# Patient Record
Sex: MALE | Race: WHITE | NOT HISPANIC OR LATINO | URBAN - METROPOLITAN AREA
[De-identification: names, ages, dates, MRNs, and addresses within clinical notes are randomized per-mention and may not be internally consistent; named-entity substitution may affect disease eponyms.]

---

## 2018-01-13 NOTE — MISCELLANEOUS
Message   Recorded as Task   Date: 10/21/2016 04:32 PM, Created By: Damion Hancock   Task Name: Med Renewal Request   Assigned To: Nataly Jones   Regarding Patient: Sushil Bean, Status: Active   Comment:    Marilyn Milan - 21 Oct 2016 4:32 PM     TASK CREATED  Patient LM on RX line to request script for Lisinopril be sent for 90 days to [facility]  Any problems please call patient back at [phone]  Thank you  Nataly Jones - 21 Oct 2016 4:49 PM     TASK REPLIED TO: Previously Assigned To Nataly Jones  Can only send 27 to the local pharmacy because he is way past his due date which was December 2015! Marilyn Milan - 21 Oct 2016 4:59 PM     TASK REPLIED TO: Previously Assigned To Marilyn Milan  see other task I sent on accident          Signatures   Electronically signed by : Era Lujan DO; Oct 22 2016  5:14PM EST                       (Author)

## 2018-01-16 NOTE — MISCELLANEOUS
Message   Recorded as Task   Date: 10/21/2016 04:58 PM, Created By: Agnieszka Mohr   Task Name: Med Renewal Request   Assigned To: Nataly Jones   Regarding Patient: Karen Durán, Status: Active   [facility] - 21 Oct 2016 4:58 PM     TASK CREATED  Spoke with patient he is scheduled to see you 11/28/2016  Please adjust his medication so that he does not run out before the visit  Also patient has moved to [place], [place] and would like the script sent to [facility] out there  I added this in his chart please re-scribe  Thank you  Marilyn Milan - 21 Oct 2016 4:58 PM     TASK REASSIGNED: Previously Assigned To Glenna Castellano - 22 Oct 2016 5:17 PM     TASK REPLIED TO: Previously Assigned To Vicki Medina   Please call patient and ask him if it is feasible for him to still come here for his primary care office  Does he simply wanted to find someone in [place]? I did send that 30 days worth of pills, but was thinking of his convenience  Please tell him this  Marilyn Milan - 24 Oct 2016 1:34 PM     TASK REPLIED TO: Previously Assigned To [facility] with patient he states he plans to continue to come to our office  He does not want to switch          Signatures   Electronically signed by : Radha Santoro DO; Oct 24 2016  5:09PM EST                       (Author)

## 2020-11-27 ENCOUNTER — TELEPHONE (OUTPATIENT)
Dept: FAMILY MEDICINE CLINIC | Facility: CLINIC | Age: 60
End: 2020-11-27

## 2021-04-08 DIAGNOSIS — Z23 ENCOUNTER FOR IMMUNIZATION: ICD-10-CM

## 2022-12-21 ENCOUNTER — TELEPHONE (OUTPATIENT)
Dept: FAMILY MEDICINE CLINIC | Facility: CLINIC | Age: 62
End: 2022-12-21

## 2022-12-21 NOTE — TELEPHONE ENCOUNTER
Patient scheduled an appointment to re-establish care here  He would like to have an order for any lab work he should have done prior

## 2022-12-22 DIAGNOSIS — I10 PRIMARY HYPERTENSION: ICD-10-CM

## 2022-12-22 DIAGNOSIS — E78.2 MIXED HYPERLIPIDEMIA: ICD-10-CM

## 2022-12-22 DIAGNOSIS — R73.09 ELEVATED HEMOGLOBIN A1C: Primary | ICD-10-CM

## 2022-12-22 DIAGNOSIS — Z12.5 PROSTATE CANCER SCREENING: ICD-10-CM

## 2022-12-22 DIAGNOSIS — E55.9 VITAMIN D DEFICIENCY: ICD-10-CM

## 2023-01-11 LAB
25(OH)D3 SERPL-MCNC: 34 NG/ML (ref 30–100)
ALBUMIN SERPL-MCNC: 4.2 G/DL (ref 3.6–5.1)
ALBUMIN/GLOB SERPL: 1.8 (CALC) (ref 1–2.5)
ALP SERPL-CCNC: 52 U/L (ref 35–144)
ALT SERPL-CCNC: 26 U/L (ref 9–46)
APPEARANCE UR: CLEAR
AST SERPL-CCNC: 16 U/L (ref 10–35)
BACTERIA UR QL AUTO: ABNORMAL /HPF
BASOPHILS # BLD AUTO: 49 CELLS/UL (ref 0–200)
BASOPHILS NFR BLD AUTO: 0.9 %
BILIRUB SERPL-MCNC: 0.7 MG/DL (ref 0.2–1.2)
BILIRUB UR QL STRIP: NEGATIVE
BUN SERPL-MCNC: 15 MG/DL (ref 7–25)
BUN/CREAT SERPL: ABNORMAL (CALC) (ref 6–22)
CALCIUM SERPL-MCNC: 9.5 MG/DL (ref 8.6–10.3)
CHLORIDE SERPL-SCNC: 100 MMOL/L (ref 98–110)
CHOLEST SERPL-MCNC: 283 MG/DL
CHOLEST/HDLC SERPL: 7.1 (CALC)
CO2 SERPL-SCNC: 29 MMOL/L (ref 20–32)
COLOR UR: YELLOW
CREAT SERPL-MCNC: 0.92 MG/DL (ref 0.7–1.35)
EOSINOPHIL # BLD AUTO: 103 CELLS/UL (ref 15–500)
EOSINOPHIL NFR BLD AUTO: 1.9 %
ERYTHROCYTE [DISTWIDTH] IN BLOOD BY AUTOMATED COUNT: 11.9 % (ref 11–15)
GFR/BSA.PRED SERPLBLD CYS-BASED-ARV: 94 ML/MIN/1.73M2
GLOBULIN SER CALC-MCNC: 2.4 G/DL (CALC) (ref 1.9–3.7)
GLUCOSE SERPL-MCNC: 281 MG/DL (ref 65–99)
GLUCOSE UR QL STRIP: ABNORMAL
HBA1C MFR BLD: 12.9 % OF TOTAL HGB
HCT VFR BLD AUTO: 50.8 % (ref 38.5–50)
HDLC SERPL-MCNC: 40 MG/DL
HGB BLD-MCNC: 16.6 G/DL (ref 13.2–17.1)
HGB UR QL STRIP: NEGATIVE
HYALINE CASTS #/AREA URNS LPF: ABNORMAL /LPF
KETONES UR QL STRIP: NEGATIVE
LDLC SERPL CALC-MCNC: 199 MG/DL (CALC)
LEUKOCYTE ESTERASE UR QL STRIP: NEGATIVE
LYMPHOCYTES # BLD AUTO: 2160 CELLS/UL (ref 850–3900)
LYMPHOCYTES NFR BLD AUTO: 40 %
MCH RBC QN AUTO: 29 PG (ref 27–33)
MCHC RBC AUTO-ENTMCNC: 32.7 G/DL (ref 32–36)
MCV RBC AUTO: 88.7 FL (ref 80–100)
MONOCYTES # BLD AUTO: 432 CELLS/UL (ref 200–950)
MONOCYTES NFR BLD AUTO: 8 %
NEUTROPHILS # BLD AUTO: 2657 CELLS/UL (ref 1500–7800)
NEUTROPHILS NFR BLD AUTO: 49.2 %
NITRITE UR QL STRIP: NEGATIVE
NONHDLC SERPL-MCNC: 243 MG/DL (CALC)
PH UR STRIP: ABNORMAL [PH] (ref 5–8)
PLATELET # BLD AUTO: 209 THOUSAND/UL (ref 140–400)
PMV BLD REES-ECKER: 11.1 FL (ref 7.5–12.5)
POTASSIUM SERPL-SCNC: 4.1 MMOL/L (ref 3.5–5.3)
PROT SERPL-MCNC: 6.6 G/DL (ref 6.1–8.1)
PROT UR QL STRIP: ABNORMAL
PSA SERPL-MCNC: 0.79 NG/ML
RBC # BLD AUTO: 5.73 MILLION/UL (ref 4.2–5.8)
RBC #/AREA URNS HPF: ABNORMAL /HPF
SODIUM SERPL-SCNC: 137 MMOL/L (ref 135–146)
SP GR UR STRIP: 1.02 (ref 1–1.03)
SQUAMOUS #/AREA URNS HPF: ABNORMAL /HPF
TRIGL SERPL-MCNC: 249 MG/DL
TSH SERPL-ACNC: 1.98 MIU/L (ref 0.4–4.5)
WBC # BLD AUTO: 5.4 THOUSAND/UL (ref 3.8–10.8)
WBC #/AREA URNS HPF: ABNORMAL /HPF

## 2023-01-19 ENCOUNTER — OFFICE VISIT (OUTPATIENT)
Dept: FAMILY MEDICINE CLINIC | Facility: CLINIC | Age: 63
End: 2023-01-19

## 2023-01-19 ENCOUNTER — PREP FOR PROCEDURE (OUTPATIENT)
Dept: GASTROENTEROLOGY | Facility: CLINIC | Age: 63
End: 2023-01-19

## 2023-01-19 ENCOUNTER — TELEPHONE (OUTPATIENT)
Dept: GASTROENTEROLOGY | Facility: CLINIC | Age: 63
End: 2023-01-19

## 2023-01-19 VITALS
HEART RATE: 97 BPM | RESPIRATION RATE: 16 BRPM | OXYGEN SATURATION: 98 % | SYSTOLIC BLOOD PRESSURE: 164 MMHG | BODY MASS INDEX: 27.11 KG/M2 | DIASTOLIC BLOOD PRESSURE: 102 MMHG | HEIGHT: 67 IN

## 2023-01-19 DIAGNOSIS — Z23 NEED FOR VACCINATION: ICD-10-CM

## 2023-01-19 DIAGNOSIS — E11.65 TYPE 2 DIABETES MELLITUS WITH HYPERGLYCEMIA, WITHOUT LONG-TERM CURRENT USE OF INSULIN (HCC): ICD-10-CM

## 2023-01-19 DIAGNOSIS — I10 PRIMARY HYPERTENSION: ICD-10-CM

## 2023-01-19 DIAGNOSIS — Z00.00 ROUTINE GENERAL MEDICAL EXAMINATION AT A HEALTH CARE FACILITY: Primary | ICD-10-CM

## 2023-01-19 DIAGNOSIS — Z12.11 SCREENING FOR COLON CANCER: Primary | ICD-10-CM

## 2023-01-19 DIAGNOSIS — E78.2 MIXED HYPERLIPIDEMIA: ICD-10-CM

## 2023-01-19 DIAGNOSIS — Z83.2 FAMILY HISTORY OF FACTOR V DEFICIENCY: ICD-10-CM

## 2023-01-19 DIAGNOSIS — E55.9 VITAMIN D DEFICIENCY: ICD-10-CM

## 2023-01-19 DIAGNOSIS — Z12.11 SCREENING FOR MALIGNANT NEOPLASM OF COLON: ICD-10-CM

## 2023-01-19 RX ORDER — ASPIRIN 81 MG/1
81 TABLET, CHEWABLE ORAL DAILY
Start: 2023-01-19

## 2023-01-19 RX ORDER — MULTIVITAMIN
1 TABLET ORAL DAILY
COMMUNITY

## 2023-01-19 RX ORDER — MELATONIN
2 DAILY
COMMUNITY

## 2023-01-19 RX ORDER — LISINOPRIL 10 MG/1
10 TABLET ORAL
Qty: 90 TABLET | Refills: 1 | Status: SHIPPED | OUTPATIENT
Start: 2023-01-19

## 2023-01-19 RX ORDER — METFORMIN HYDROCHLORIDE 500 MG/1
500 TABLET, EXTENDED RELEASE ORAL 2 TIMES DAILY WITH MEALS
Qty: 180 TABLET | Refills: 1 | Status: SHIPPED | OUTPATIENT
Start: 2023-01-19

## 2023-01-19 NOTE — PATIENT INSTRUCTIONS
Begin lisinopril 10 mg in the evening    Begin metformin one with breakfast one with evening meal    Call to schedule time for colonoscopy    Call to make an eye exam appointment    Call to make a dental appointment    You received a Prevnar pneumonia shot today as well as the shingles vaccine    We will give you 2nd shingles vaccine and your tetanus shot    Recheck here in 3 months or sooner if needed

## 2023-01-19 NOTE — PROGRESS NOTES
SUBJECTIVE:--------------------------------------------------------------------------------------------------    Quin Echeverria is a 58 y o   male and is here for routine health maintenance  Health Maintenance   Topic Date Due   • BMI: Adult  Never done   • Annual Physical  Never done   • Colorectal Cancer Screening  Never done   • DTaP,Tdap,and Td Vaccines (2 - Td or Tdap) 12/31/2022   • HIV Screening  01/19/2025 (Originally 2/22/1975)   • Hepatitis C Screening  02/19/2025 (Originally 1960)   • Depression Screening  01/19/2024   • Influenza Vaccine  Completed   • COVID-19 Vaccine  Completed   • Pneumococcal Vaccine: Pediatrics (0 to 5 Years) and At-Risk Patients (6 to 59 Years)  Aged Out   • HIB Vaccine  Aged Out   • IPV Vaccine  Aged Out   • Hepatitis A Vaccine  Aged Out   • Meningococcal ACWY Vaccine  Aged Out   • HPV Vaccine  Aged [employer] History   Administered Date(s) Administered   • COVID-19 MODERNA VACC 0 5 ML IM 11/03/2021, 04/11/2022, 09/26/2022   • COVID-19 Moderna Vac BIVALENT 6 Yr+ IM (BOOSTER ONLY) 09/26/2022   • COVID-19 PFIZER VACCINE 0 3 ML IM 04/01/2021, 04/22/2021   • INFLUENZA 09/29/2015, 11/28/2016, 10/03/2020, 09/26/2022, 09/26/2022   • Influenza Quadrivalent, 6-35 Months IM 09/29/2015, 11/28/2016   • Pneumococcal Polysaccharide PPV23 03/30/2015   • Tdap 12/31/2012       Diet and Physical Activity  Diet: poor diet  Weight concerns: Patient has class 2 obesity (BMI 35 0-39  9)  Exercise: occasionally       General Health  Hearing:  Normal reported by patient  Vision:  due  Dental:  due    Smoker no       ASSESSMENT/PLAN:---------------------------------------------------------------------------------------    Patient's physical is up-to-date  Immunizations are up-to-date  Cancer screenings are up-to-date      Patient instructed on exercise  Patient instructed on healthy choices for diet      NEXT PHYSICAL 1 YEAR        The following portions of the patient's history were reviewed and updated as appropriate: allergies, current medications, past family history, past medical history, past social history, past surgical history and problem list       OBJECTIVE:--------------------------------------------------------------------------------------------------  BP (!) 164/102 (BP Location: Left arm, Patient Position: Sitting, Cuff Size: Standard)   Pulse 97   Resp 16   Ht 5' 6 5" (1 689 m)   SpO2 98%   BMI 27 11 kg/m²   Wt Readings from Last 3 Encounters:   11/28/16 77 3 kg (170 lb 8 oz)   09/29/15 72 8 kg (160 lb 8 oz)   03/30/15 85 9 kg (189 lb 6 4 oz)     BP Readings from Last 3 Encounters:   01/19/23 (!) 164/102   11/28/16 148/60   09/29/15 110/70     Pulse Readings from Last 3 Encounters:   01/19/23 97   11/28/16 60   09/29/15 60     Body mass index is 27 11 kg/m²  Health Maintenance   Topic Date Due   • BMI: Adult  Never done   • Annual Physical  Never done   • Colorectal Cancer Screening  Never done   • DTaP,Tdap,and Td Vaccines (2 - Td or Tdap) 12/31/2022   • HIV Screening  01/19/2025 (Originally 2/22/1975)   • Hepatitis C Screening  02/19/2025 (Originally 1960)   • Depression Screening  01/19/2024   • Influenza Vaccine  Completed   • COVID-19 Vaccine  Completed   • Pneumococcal Vaccine: Pediatrics (0 to 5 Years) and At-Risk Patients (6 to 59 Years)  Aged Out   • HIB Vaccine  Aged Out   • IPV Vaccine  Aged Out   • Hepatitis A Vaccine  Aged Out   • Meningococcal ACWY Vaccine  Aged Out   • HPV Vaccine  Aged Out         ROS:   12 point review of systems negative            PHYSICAL EXAM:    Gen    No acute distress well-appearing well-nourished appears stated age    Mental status  Good judgment and insight oriented to time person and place, recent and remote memory intact mood and affect normal cooperative and patient is reasonable    HEENT  PERRLA 3 mm, EOMI without nystagmus, TMs clear, turbinates open pink no exudate, pharynx benign, tongue midline    Neck   supple no masses trachea midline positive click normal carotid upstrokes with no bruits    Cor  Regular rhythm without ectopy or murmur, no S3-S4, normal palpation that is no heave lift or thrill    Vascular  No edema, good pedal pulses    Lungs  CTA bilaterally in no respiratory distress no wheezes rhonchi or rales, normal to palpation no tactile fremitus    Abdomen  Soft, no palpable masses, no hepatosplenomegaly, normal bowel sounds, nontender    Lymphatics  No palpable nodes in the neck, supraclavicular area, axilla, or groin     Musculoskeletal  No clubbing cyanosis or edema muscle tone normal    Skin  no rashes or abnormal appearing lesions    Neuro  Normal ambulation, cranial nerves 2-12 grossly intact, higher functioning with reasoning intact

## 2023-01-19 NOTE — PROGRESS NOTES
ASSESSMENT/PLAN:    Hypertension  Very elevated at 164/102  We will start slowly at 1st with his blood pressure and start with lisinopril 10 mg in the evening  Recheck patient in 3 months  Wait until its better control before doing his EKG    Type 2 diabetes  A1c is 12 9 from 7 2  Begin metformin 500 mg twice daily once with breakfast once with evening meal and recheck A1c in 3 months    Hyperlipidemia   patient will try to cut out his sweets and his candy and his licorice  Next visit we will add Lipitor as long as everything else is going in order  His cholesterol is now 283 from 236, triglycerides 249 from 221, and  from 150    Patient will get his 1st Shingrix today and a 2nd one when he returns in 3 months  Patient will get Prevnar 20 today  Patient will get his Adacel when he comes back in 3 months    Information for colonoscopy given  Information for eye exam and dentist    We will check him for factor V deficiency with his next blood work which will include A1c and factor V        Depression Screening and Follow-up Plan: Patient was screened for depression during today's encounter  They screened negative with a PHQ-2 score of 0             Health Maintenance   Topic Date Due   • BMI: Adult  Never done   • Annual Physical  Never done   • Colorectal Cancer Screening  Never done   • DTaP,Tdap,and Td Vaccines (2 - Td or Tdap) 12/31/2022   • HIV Screening  01/19/2025 (Originally 2/22/1975)   • Hepatitis C Screening  02/19/2025 (Originally 1960)   • Depression Screening  01/19/2024   • Influenza Vaccine  Completed   • COVID-19 Vaccine  Completed   • Pneumococcal Vaccine: Pediatrics (0 to 5 Years) and At-Risk Patients (6 to 59 Years)  Aged Out   • HIB Vaccine  Aged Out   • IPV Vaccine  Aged Out   • Hepatitis A Vaccine  Aged Out   • Meningococcal ACWY Vaccine  Aged Out   • HPV Vaccine  Aged Out         Problem List as of 1/19/2023 Reviewed: 1/19/2023  3:12 PM by Roslynn Homans, DO    Elevated hemoglobin A1c Erectile dysfunction of non-organic origin    Mixed hyperlipidemia    Primary hypertension    Vitamin D deficiency         Subjective:   Chief Complaint   Patient presents with   • Establish Care   • Physical Exam     Re-establish   • Hand Pain     Right index finger     Patient is here as a new patient returning after last being seen here in 2016 with a diagnoses of hypertension diabetes and hypercholesterolemia    He has been out of state working and is now back in state  Unfortunately he never really kept up with taking care of his medical disorders and has not had any medicine since that time  He has blood work done today for us to review    He used to run and really like it but has not done that for a while maybe walks twice weekly    Has 2 brothers diagnosed with factor V so he would like to have that checked    Really has no complaints and feels really good which is the reason he never really followed up with anybody      patient ID: Siobhan Denis is a 58 y o  male  Patient's past medical history, surgical history, family history, social history, and Tobacco history reviewed with patient  MED LIST WAS REVIEWED AND UPDATED    ROS  As per HPI  Rest of 12 point review of systems negative     Objective:      VITALS:  Wt Readings from Last 3 Encounters:   11/28/16 77 3 kg (170 lb 8 oz)   09/29/15 72 8 kg (160 lb 8 oz)   03/30/15 85 9 kg (189 lb 6 4 oz)     BP Readings from Last 3 Encounters:   01/19/23 (!) 164/102   11/28/16 148/60   09/29/15 110/70     Pulse Readings from Last 3 Encounters:   01/19/23 97   11/28/16 60   09/29/15 60     Body mass index is 27 11 kg/m²  Laboratory Results: All pertinent labs and studies were reviewed with patient during this office visit with highlights of the results contained in this note in the ASSESSMENT AND PLAN section       Physical Exam      Gen    No acute distress well-appearing well-nourished appears stated age    Mental status  Good judgment and insight oriented to time person and place, recent and remote memory intact mood and affect normal cooperative and patient is reasonable    HEENT  PERRLA 3 mm, EOMI without nystagmus, normocephalic atraumatic without facial weakness    Neck   supple no masses trachea midline positive click normal carotid upstrokes with no bruits    Cor  Regular rhythm without ectopy or murmur, no S3-S4, normal palpation that is no heave lift or thrill    Vascular  No edema, good pedal pulses    Lungs  CTA bilaterally in no respiratory distress no wheezes rhonchi or rales, normal to palpation no tactile fremitus    Abdomen  Soft, no palpable masses, no hepatosplenomegaly, normal bowel sounds, nontender    Lymphatics  No palpable nodes in the neck, supraclavicular area, axilla, or groin    Musculoskeletal  No clubbing cyanosis or edema muscle tone normal    Skin  no rashes or abnormal appearing lesions    Neuro  Normal ambulation, cranial nerves 2-12 grossly intact, higher functioning with reasoning intact

## 2023-01-19 NOTE — TELEPHONE ENCOUNTER
Scheduled date of colonoscopy (as of today):4/10/23  Physician performing colonoscopy:DR JAIYEOLA  Location of colonoscopy:[facility]  Clearances: NA

## 2023-02-06 ENCOUNTER — TELEPHONE (OUTPATIENT)
Dept: FAMILY MEDICINE CLINIC | Facility: CLINIC | Age: 63
End: 2023-02-06

## 2023-02-06 NOTE — TELEPHONE ENCOUNTER
Due to Agustín Owens getting his shingrix and prevnar at his appointment on 1/19, he was supposed to wait and get his Adacel at his next appointment in 3 months, or April  His daughter is expecting a baby and due this month so he and his wife are leaving to go visit them down [address] [place] on February 25  His daughter's doctor wants him to have it before visiting  Can he get this before February 25?

## 2023-02-10 LAB
LEFT EYE DIABETIC RETINOPATHY: NORMAL
RIGHT EYE DIABETIC RETINOPATHY: NORMAL

## 2023-02-10 PROCEDURE — 2023F DILAT RTA XM W/O RTNOPTHY: CPT | Performed by: FAMILY MEDICINE

## 2023-02-14 ENCOUNTER — CLINICAL SUPPORT (OUTPATIENT)
Dept: FAMILY MEDICINE CLINIC | Facility: CLINIC | Age: 63
End: 2023-02-14

## 2023-02-14 DIAGNOSIS — Z23 NEED FOR VACCINATION: Primary | ICD-10-CM

## 2023-04-07 ENCOUNTER — TELEPHONE (OUTPATIENT)
Dept: GASTROENTEROLOGY | Facility: MEDICAL CENTER | Age: 63
End: 2023-04-07

## 2023-04-07 NOTE — TELEPHONE ENCOUNTER
Spoke to patient and moved per Anesthesiologist review to hospital setting due to his A1C for Monday 04/10/2023  Pt is aware that he will receive a call today with his time, he is aware of [address] [place] [address] location  Pt will have his A1C redrawn today to get better understanding of his levels

## 2023-04-08 LAB
25(OH)D3 SERPL-MCNC: 65 NG/ML (ref 30–100)
ALBUMIN SERPL-MCNC: 4.1 G/DL (ref 3.6–5.1)
ALBUMIN/GLOB SERPL: 1.9 (CALC) (ref 1–2.5)
ALP SERPL-CCNC: 49 U/L (ref 35–144)
ALT SERPL-CCNC: 32 U/L (ref 9–46)
APPEARANCE UR: CLEAR
AST SERPL-CCNC: 28 U/L (ref 10–35)
BASOPHILS # BLD AUTO: 61 CELLS/UL (ref 0–200)
BASOPHILS NFR BLD AUTO: 1.2 %
BILIRUB SERPL-MCNC: 0.6 MG/DL (ref 0.2–1.2)
BILIRUB UR QL STRIP: NEGATIVE
BUN SERPL-MCNC: 17 MG/DL (ref 7–25)
BUN/CREAT SERPL: NORMAL (CALC) (ref 6–22)
CALCIUM SERPL-MCNC: 9.3 MG/DL (ref 8.6–10.3)
CHLORIDE SERPL-SCNC: 103 MMOL/L (ref 98–110)
CHOLEST SERPL-MCNC: 198 MG/DL
CHOLEST/HDLC SERPL: 4.5 (CALC)
CO2 SERPL-SCNC: 23 MMOL/L (ref 20–32)
COLOR UR: ABNORMAL
CREAT SERPL-MCNC: 0.96 MG/DL (ref 0.7–1.35)
EOSINOPHIL # BLD AUTO: 117 CELLS/UL (ref 15–500)
EOSINOPHIL NFR BLD AUTO: 2.3 %
ERYTHROCYTE [DISTWIDTH] IN BLOOD BY AUTOMATED COUNT: 12.7 % (ref 11–15)
GFR/BSA.PRED SERPLBLD CYS-BASED-ARV: 89 ML/MIN/1.73M2
GLOBULIN SER CALC-MCNC: 2.2 G/DL (CALC) (ref 1.9–3.7)
GLUCOSE SERPL-MCNC: 95 MG/DL (ref 65–99)
GLUCOSE UR QL STRIP: NEGATIVE
HBA1C MFR BLD: 6.9 % OF TOTAL HGB
HCT VFR BLD AUTO: 42.6 % (ref 38.5–50)
HDLC SERPL-MCNC: 44 MG/DL
HGB BLD-MCNC: 14 G/DL (ref 13.2–17.1)
HGB UR QL STRIP: NEGATIVE
KETONES UR QL STRIP: ABNORMAL
LDLC SERPL CALC-MCNC: 132 MG/DL (CALC)
LEUKOCYTE ESTERASE UR QL STRIP: NEGATIVE
LYMPHOCYTES # BLD AUTO: 1780 CELLS/UL (ref 850–3900)
LYMPHOCYTES NFR BLD AUTO: 34.9 %
MCH RBC QN AUTO: 29.5 PG (ref 27–33)
MCHC RBC AUTO-ENTMCNC: 32.9 G/DL (ref 32–36)
MCV RBC AUTO: 89.7 FL (ref 80–100)
MONOCYTES # BLD AUTO: 490 CELLS/UL (ref 200–950)
MONOCYTES NFR BLD AUTO: 9.6 %
NEUTROPHILS # BLD AUTO: 2652 CELLS/UL (ref 1500–7800)
NEUTROPHILS NFR BLD AUTO: 52 %
NITRITE UR QL STRIP: NEGATIVE
NONHDLC SERPL-MCNC: 154 MG/DL (CALC)
PH UR STRIP: ABNORMAL [PH] (ref 5–8)
PLATELET # BLD AUTO: 215 THOUSAND/UL (ref 140–400)
PMV BLD REES-ECKER: 11.1 FL (ref 7.5–12.5)
POTASSIUM SERPL-SCNC: 4.4 MMOL/L (ref 3.5–5.3)
PROT SERPL-MCNC: 6.3 G/DL (ref 6.1–8.1)
PROT UR QL STRIP: NEGATIVE
PSA SERPL-MCNC: 1.85 NG/ML
RBC # BLD AUTO: 4.75 MILLION/UL (ref 4.2–5.8)
SODIUM SERPL-SCNC: 139 MMOL/L (ref 135–146)
SP GR UR STRIP: 1.03 (ref 1–1.03)
TRIGL SERPL-MCNC: 114 MG/DL
TSH SERPL-ACNC: 1.23 MIU/L (ref 0.4–4.5)
WBC # BLD AUTO: 5.1 THOUSAND/UL (ref 3.8–10.8)

## 2023-04-10 RX ORDER — LIDOCAINE HYDROCHLORIDE 10 MG/ML
0.5 INJECTION, SOLUTION EPIDURAL; INFILTRATION; INTRACAUDAL; PERINEURAL ONCE AS NEEDED
Status: CANCELLED | OUTPATIENT
Start: 2023-04-10

## 2023-04-10 RX ORDER — SODIUM CHLORIDE, SODIUM LACTATE, POTASSIUM CHLORIDE, CALCIUM CHLORIDE 600; 310; 30; 20 MG/100ML; MG/100ML; MG/100ML; MG/100ML
125 INJECTION, SOLUTION INTRAVENOUS CONTINUOUS
Status: CANCELLED | OUTPATIENT
Start: 2023-04-10

## 2023-08-23 LAB
ALBUMIN SERPL-MCNC: 4.3 G/DL (ref 3.6–5.1)
ALBUMIN SERPL-MCNC: 4.3 G/DL (ref 3.6–5.1)
ALBUMIN/CREAT UR: 6 MCG/MG CREAT
ALBUMIN/GLOB SERPL: 2 (CALC) (ref 1–2.5)
ALP SERPL-CCNC: 47 U/L (ref 35–144)
ALT SERPL-CCNC: 17 U/L (ref 9–46)
AST SERPL-CCNC: 19 U/L (ref 10–35)
BILIRUB DIRECT SERPL-MCNC: 0.1 MG/DL
BILIRUB INDIRECT SERPL-MCNC: 0.5 MG/DL (CALC) (ref 0.2–1.2)
BILIRUB SERPL-MCNC: 0.6 MG/DL (ref 0.2–1.2)
BUN SERPL-MCNC: 19 MG/DL (ref 7–25)
BUN/CREAT SERPL: ABNORMAL (CALC) (ref 6–22)
CALCIUM SERPL-MCNC: 9.4 MG/DL (ref 8.6–10.3)
CHLORIDE SERPL-SCNC: 104 MMOL/L (ref 98–110)
CHOLEST SERPL-MCNC: 173 MG/DL
CHOLEST/HDLC SERPL: 3.8 (CALC)
CO2 SERPL-SCNC: 27 MMOL/L (ref 20–32)
CREAT SERPL-MCNC: 0.94 MG/DL (ref 0.7–1.35)
CREAT UR-MCNC: 77 MG/DL (ref 20–320)
GFR/BSA.PRED SERPLBLD CYS-BASED-ARV: 91 ML/MIN/1.73M2
GLOBULIN SER CALC-MCNC: 2.2 G/DL (CALC) (ref 1.9–3.7)
GLUCOSE SERPL-MCNC: 120 MG/DL (ref 65–99)
HBA1C MFR BLD: 6.1 % OF TOTAL HGB
HDLC SERPL-MCNC: 46 MG/DL
LDLC SERPL CALC-MCNC: 100 MG/DL (CALC)
MICROALBUMIN UR-MCNC: 0.5 MG/DL
NONHDLC SERPL-MCNC: 127 MG/DL (CALC)
PHOSPHATE SERPL-MCNC: 3.9 MG/DL (ref 2.5–4.5)
POTASSIUM SERPL-SCNC: 4.6 MMOL/L (ref 3.5–5.3)
PROT SERPL-MCNC: 6.5 G/DL (ref 6.1–8.1)
SODIUM SERPL-SCNC: 140 MMOL/L (ref 135–146)
TRIGL SERPL-MCNC: 174 MG/DL

## 2023-08-29 ENCOUNTER — OFFICE VISIT (OUTPATIENT)
Dept: FAMILY MEDICINE CLINIC | Facility: CLINIC | Age: 63
End: 2023-08-29
Payer: COMMERCIAL

## 2023-08-29 VITALS
DIASTOLIC BLOOD PRESSURE: 84 MMHG | SYSTOLIC BLOOD PRESSURE: 130 MMHG | HEART RATE: 61 BPM | WEIGHT: 182.2 LBS | TEMPERATURE: 97.7 F | OXYGEN SATURATION: 98 % | RESPIRATION RATE: 15 BRPM | BODY MASS INDEX: 28.6 KG/M2 | HEIGHT: 67 IN

## 2023-08-29 DIAGNOSIS — E55.9 VITAMIN D DEFICIENCY: ICD-10-CM

## 2023-08-29 DIAGNOSIS — I10 PRIMARY HYPERTENSION: ICD-10-CM

## 2023-08-29 DIAGNOSIS — E11.65 TYPE 2 DIABETES MELLITUS WITH HYPERGLYCEMIA, WITHOUT LONG-TERM CURRENT USE OF INSULIN (HCC): Primary | ICD-10-CM

## 2023-08-29 DIAGNOSIS — E78.2 MIXED HYPERLIPIDEMIA: ICD-10-CM

## 2023-08-29 PROCEDURE — 99215 OFFICE O/P EST HI 40 MIN: CPT | Performed by: FAMILY MEDICINE

## 2023-08-29 RX ORDER — LISINOPRIL 10 MG/1
10 TABLET ORAL
Qty: 90 TABLET | Refills: 1 | Status: SHIPPED | OUTPATIENT
Start: 2023-08-29

## 2023-08-29 RX ORDER — ROSUVASTATIN CALCIUM 5 MG/1
10 TABLET, COATED ORAL EVERY EVENING
Qty: 90 TABLET | Refills: 3 | Status: SHIPPED | OUTPATIENT
Start: 2023-08-29

## 2023-08-29 RX ORDER — METFORMIN HYDROCHLORIDE 500 MG/1
500 TABLET, EXTENDED RELEASE ORAL 2 TIMES DAILY WITH MEALS
Qty: 180 TABLET | Refills: 1 | Status: SHIPPED | OUTPATIENT
Start: 2023-08-29

## 2023-08-29 NOTE — PROGRESS NOTES
ASSESSMENT/PLAN:    Hypertension  Blood pressure 130/84  Continue lisinopril and exercise and recheck in 3 months    Type 2 diabetes  Despite medication A1c 6.1 from 6.9  Continue dietary changes and metformin for now  If we get into the fives we will start taking away the metformin    Hyperlipidemia  Triglycerides 174 from 114 but everything else improved  However the LDL is 100 from 132 but we really needed to be 80 for protection for his heart  Since he is already on rosuvastatin we will go from 5-10 recheck his lipids and liver with a A1c in 4 months          Recheck in 4 months with fasting blood work to check lipids and liver because of adding rosuvastatin and A1c  Recheck sooner if needed           Depression Screening and Follow-up Plan: Patient was screened for depression during today's encounter. They screened negative with a PHQ-2 score of 0.            Health Maintenance   Topic Date Due   • COVID-19 Vaccine (6 - Pfizer series) 11/21/2022   • Influenza Vaccine (1) 09/01/2023   • HIV Screening  01/19/2025 (Originally 2/22/1975)   • Hepatitis C Screening  02/19/2025 (Originally 1960)   • Annual Physical  01/19/2024   • HEMOGLOBIN A1C  02/23/2024   • BMI: Followup Plan  04/18/2024   • BMI: Adult  04/18/2024   • Diabetic Foot Exam  04/18/2024   • Kidney Health Evaluation: GFR  08/23/2024   • Kidney Health Evaluation: Albumin/Creatinine Ratio  08/23/2024   • Depression Screening  08/29/2024   • DM Eye Exam  02/10/2025   • Colorectal Cancer Screening  04/09/2026   • DTaP,Tdap,and Td Vaccines (3 - Td or Tdap) 02/14/2033   • Pneumococcal Vaccine: Pediatrics (0 to 5 Years) and At-Risk Patients (6 to 59 Years)  Completed   • HIB Vaccine  Aged Out   • IPV Vaccine  Aged Out   • Hepatitis A Vaccine  Aged Out   • Meningococcal ACWY Vaccine  Aged Out   • HPV Vaccine  Aged Out         Problem List as of 8/29/2023 Reviewed: 4/18/2023  8:05 PM by James Amaro, DO    Erectile dysfunction of non-organic origin    Mixed hyperlipidemia    Primary hypertension    Type 2 diabetes mellitus with hyperglycemia, without long-term current use of insulin (HCC)    Vitamin D deficiency         Subjective:   Chief Complaint   Patient presents with   • Follow-up     Diabetes   • Diabetes     Patient is here for his 4-month diabetes recheck    Since last visit patient has been on it 8-week vacation and Praneeth Cristhian every bit of it  He ate more and drink more than usual and walks less  Nonetheless his A1c has improved and he feels good    He has blood work for us to review      patient ID: Lucas Marks is a 61 y.o. male. Patient's past medical history, surgical history, family history, social history, and Tobacco history reviewed with patient. MED LIST WAS REVIEWED AND UPDATED    ROS  As per HPI  Rest of 12 point review of systems negative     Objective:      VITALS:  Wt Readings from Last 3 Encounters:   08/29/23 82.6 kg (182 lb 3.2 oz)   04/18/23 82.9 kg (182 lb 12.8 oz)   04/10/23 81.6 kg (180 lb)     BP Readings from Last 3 Encounters:   08/29/23 130/84   04/18/23 120/74   04/10/23 116/63     Pulse Readings from Last 3 Encounters:   08/29/23 61   04/18/23 70   04/10/23 61     Body mass index is 28.97 kg/m². Laboratory Results:    All pertinent labs and studies were reviewed with patient during this office visit with highlights of the results contained in this note in the ASSESSMENT AND PLAN section       Physical Exam    Constitutional  Appears healthy, Looks well, Appearance consistent with age    Mental Status  Alert, Oriented, Cooperative, Memory function normal , clean, and reasonable    Neck  No neck mass, No thyromegaly, Good carotid upstrokes bilaterally, trachea midline positive click    Respiratory  Breath sounds normal, No rales, No rhonchi, No wheezing, normal palpation    Cardiac  Regular rhythm without ectopy or murmur no S3-S4, no heave lift or thrill to palpation    Vascular  No leg edema, No pedal edema    Muscular skeletal  No clubbing cyanosis , muscle tone normal    Skin  No appreciable rashes or abnormal appearing lesions

## 2023-08-29 NOTE — PATIENT INSTRUCTIONS
Please increase your rosuvastatin from 5 to 10 mg tablets  Take 2 together in the evening until you run out of the 5  New prescription is tense only take one of those once you get that  Continue the co-Q10  We will recheck the value in 4 months when you come in to have your A1c checked    Continue everything else as it is  Goal is to get A1c into the 5 range then we could start backing off on the metformin    See in 4 months with fasting blood work prior or sooner if needed  Member flu shot and COVID shot the end of September

## 2023-09-18 DIAGNOSIS — E11.65 TYPE 2 DIABETES MELLITUS WITH HYPERGLYCEMIA, WITHOUT LONG-TERM CURRENT USE OF INSULIN (HCC): ICD-10-CM

## 2023-09-18 NOTE — TELEPHONE ENCOUNTER
Pt claims, according to [facility], ins will not cover 2 tablets per day  - can you write the script for 1 tablet @ 10 mg, 1xday instead?

## 2023-09-19 RX ORDER — ROSUVASTATIN CALCIUM 10 MG/1
10 TABLET, COATED ORAL EVERY EVENING
Qty: 90 TABLET | Refills: 3 | Status: SHIPPED | OUTPATIENT
Start: 2023-09-19

## 2024-01-04 ENCOUNTER — OFFICE VISIT (OUTPATIENT)
Dept: FAMILY MEDICINE CLINIC | Facility: CLINIC | Age: 64
End: 2024-01-04
Payer: COMMERCIAL

## 2024-01-04 ENCOUNTER — HOSPITAL ENCOUNTER (OUTPATIENT)
Dept: NON INVASIVE DIAGNOSTICS | Facility: HOSPITAL | Age: 64
Discharge: HOME | End: 2024-01-04
Payer: COMMERCIAL

## 2024-01-04 VITALS
HEIGHT: 66 IN | BODY MASS INDEX: 31.39 KG/M2 | WEIGHT: 195.3 LBS | SYSTOLIC BLOOD PRESSURE: 170 MMHG | HEART RATE: 74 BPM | RESPIRATION RATE: 16 BRPM | DIASTOLIC BLOOD PRESSURE: 98 MMHG

## 2024-01-04 DIAGNOSIS — M79.662 PAIN OF LEFT CALF: Primary | ICD-10-CM

## 2024-01-04 DIAGNOSIS — E11.65 TYPE 2 DIABETES MELLITUS WITH HYPERGLYCEMIA, WITHOUT LONG-TERM CURRENT USE OF INSULIN (HCC): ICD-10-CM

## 2024-01-04 DIAGNOSIS — M79.662 PAIN OF LEFT CALF: ICD-10-CM

## 2024-01-04 PROCEDURE — 99214 OFFICE O/P EST MOD 30 MIN: CPT | Performed by: FAMILY MEDICINE

## 2024-01-04 PROCEDURE — 93971 EXTREMITY STUDY: CPT

## 2024-01-04 PROCEDURE — 93971 EXTREMITY STUDY: CPT | Performed by: SURGERY

## 2024-01-04 NOTE — PROGRESS NOTES
Assessment/Plan:    Calf pain  Stat ultrasound of his lower leg to rule out DVT especially before he drives to [place] starting in 2 days    If this is negative he will go to physical therapy for stretching exercises ultrasound and possibly [facility] [employer].  Will wait to order until we know what ultrasound shows    Diabetes  Patient has been under control with diet  Pain can increase it so we will keep this in mind at his next office visit and recheck his A1c          Depression Screening and Follow-up Plan: Patient was screened for depression during today's encounter. They screened negative with a PHQ-2 score of 0.          Subjective:   Chuck Cobos is a 63 y.o.male  Chief Complaint   Patient presents with    Leg Pain     L calf pain     About a month ago while patient was running he felt a tightening in his left calf so he stopped and rested it and started walking and it seemed to feel better.  Yesterday patient went to the gym and started a light run on a treadmill and after about a mile and a half it started to tighten up again with the same feeling, as though someone has punched his calf  There is no noticeable swelling or change in color of the skin  Has not had anything like this in the past  Leaving for [place] for 2 weeks at the end of the week        Past medical history, social history, and family history reviewed as appropriate for the complaint of this patient.      MEDICATIONS REVIEWED AND UPDATED    10 point review of systems performed, the remainder of the ROS is negative except for what is noted in the history of chief complaint    Objective:    Vitals:    01/04/24 1444   BP: 170/98   Pulse: 74   Resp: 16     Body mass index is 31.76 kg/m².    Physical Exam    General  Patient in no acute distress, well appearing, well nourished and appears stated age    Mental status  Good judgment and insight, oriented to time person and place, recent and remote memory is intact, mood and affect are normal,  cooperative, and patient is reasonable.    Left calf/gastrocnemius  Normal size shape some pain behind the gastrocnemius on the medial side with palpation  Negative Homans

## 2024-01-04 NOTE — PATIENT INSTRUCTIONS
We will get an ultrasound either tonight or tomorrow morning    If it is negative then call us when you get back so we could set up an order for physical therapy for you

## 2024-01-16 LAB — HBA1C MFR BLD HPLC: 7 %

## 2024-01-25 ENCOUNTER — OFFICE VISIT (OUTPATIENT)
Dept: FAMILY MEDICINE CLINIC | Facility: CLINIC | Age: 64
End: 2024-01-25
Payer: COMMERCIAL

## 2024-01-25 VITALS
RESPIRATION RATE: 15 BRPM | BODY MASS INDEX: 31.42 KG/M2 | TEMPERATURE: 97.8 F | HEART RATE: 88 BPM | HEIGHT: 66 IN | DIASTOLIC BLOOD PRESSURE: 80 MMHG | SYSTOLIC BLOOD PRESSURE: 132 MMHG | WEIGHT: 195.5 LBS | OXYGEN SATURATION: 97 %

## 2024-01-25 DIAGNOSIS — M79.662 PAIN OF LEFT CALF: ICD-10-CM

## 2024-01-25 DIAGNOSIS — Z12.5 SCREENING FOR PROSTATE CANCER: ICD-10-CM

## 2024-01-25 DIAGNOSIS — N52.8 OTHER MALE ERECTILE DYSFUNCTION: ICD-10-CM

## 2024-01-25 DIAGNOSIS — I10 PRIMARY HYPERTENSION: ICD-10-CM

## 2024-01-25 DIAGNOSIS — E55.9 VITAMIN D DEFICIENCY: ICD-10-CM

## 2024-01-25 DIAGNOSIS — Z00.00 ROUTINE GENERAL MEDICAL EXAMINATION AT A HEALTH CARE FACILITY: ICD-10-CM

## 2024-01-25 DIAGNOSIS — E78.2 MIXED HYPERLIPIDEMIA: ICD-10-CM

## 2024-01-25 DIAGNOSIS — E11.65 TYPE 2 DIABETES MELLITUS WITH HYPERGLYCEMIA, WITHOUT LONG-TERM CURRENT USE OF INSULIN (HCC): Primary | ICD-10-CM

## 2024-01-25 PROCEDURE — 99396 PREV VISIT EST AGE 40-64: CPT | Performed by: FAMILY MEDICINE

## 2024-01-25 PROCEDURE — 99214 OFFICE O/P EST MOD 30 MIN: CPT | Performed by: FAMILY MEDICINE

## 2024-01-25 RX ORDER — LISINOPRIL 10 MG/1
10 TABLET ORAL
Qty: 90 TABLET | Refills: 1 | Status: SHIPPED | OUTPATIENT
Start: 2024-01-25

## 2024-01-25 RX ORDER — EZETIMIBE 10 MG/1
10 TABLET ORAL DAILY
Qty: 90 TABLET | Refills: 3 | Status: SHIPPED | OUTPATIENT
Start: 2024-01-25 | End: 2025-01-19

## 2024-01-25 RX ORDER — METFORMIN HYDROCHLORIDE 500 MG/1
500 TABLET, EXTENDED RELEASE ORAL 2 TIMES DAILY WITH MEALS
Qty: 180 TABLET | Refills: 1 | Status: SHIPPED | OUTPATIENT
Start: 2024-01-25

## 2024-01-25 RX ORDER — TADALAFIL 5 MG/1
5 TABLET ORAL DAILY PRN
Qty: 90 TABLET | Refills: 3 | Status: SHIPPED | OUTPATIENT
Start: 2024-01-25 | End: 2024-01-29 | Stop reason: SDUPTHER

## 2024-01-25 NOTE — PROGRESS NOTES
SUBJECTIVE:--------------------------------------------------------------------------------------------------  Patient is here for a well exam         Chuck Cobos is a 63 y.o.  male and is here for routine health maintenance.     Health Maintenance   Topic Date Due    COVID-19 Vaccine (8 - 2023-24 season) 12/24/2023    Annual Physical  01/19/2024    HEMOGLOBIN A1C  02/23/2024    HIV Screening  01/19/2025 (Originally 2/22/1975)    Hepatitis C Screening  02/19/2025 (Originally 1960)    Kidney Health Evaluation: GFR  08/23/2024    Kidney Health Evaluation: Albumin/Creatinine Ratio  08/23/2024    Depression Screening  01/04/2025    Diabetic Foot Exam  01/25/2025    DM Eye Exam  02/10/2025    Colorectal Cancer Screening  04/09/2026    DTaP,Tdap,and Td Vaccines (3 - Td or Tdap) 02/14/2033    Zoster Vaccine  Completed    Pneumococcal Vaccine: Pediatrics (0 to 5 Years) and At-Risk Patients (6 to 64 Years)  Completed    Influenza Vaccine  Completed    HIB Vaccine  Aged Out    IPV Vaccine  Aged Out    Hepatitis A Vaccine  Aged Out    Meningococcal ACWY Vaccine  Aged Out    HPV Vaccine  Aged Out     Immunization History   Administered Date(s) Administered    COVID-19 MODERNA VACC 0.5 ML IM 11/03/2021, 04/11/2022, 09/26/2022    COVID-19 Moderna Vac BIVALENT 12 Yr+ IM 0.5 ML 09/26/2022    COVID-19 PFIZER VACCINE 0.3 ML IM 04/01/2021, 04/22/2021    COVID-19 Pfizer Vac BIVALENT Lucia-sucrose 12 Yr+ IM 10/29/2023    COVID-19 Pfizer mRNA vacc PF lucia-sucrose 12 yr and older (Comirnaty) 10/29/2023    INFLUENZA 09/29/2015, 11/28/2016, 10/03/2020, 09/26/2022, 09/26/2022, 09/25/2023    Influenza Quadrivalent, 6-35 Months IM 09/29/2015, 11/28/2016    Pneumococcal Conjugate Vaccine 20-valent (Pcv20), Polysace 01/19/2023    Pneumococcal Polysaccharide PPV23 03/30/2015    Tdap 12/31/2012, 02/14/2023    Zoster Vaccine Recombinant 01/19/2023, 04/18/2023       Diet and Physical Activity  Diet: low fat diet and low carbohydrate  "diet  Weight concerns: Patient has class 1 obesity (BMI 30-34.9)  Exercise: infrequently       General Health  Hearing:  Normal reported by patient  Vision:  Sees Ophthalmology/Optometry yearly  Dental:  Sees his dentist every 6 months    Smoker no       ASSESSMENT/PLAN:---------------------------------------------------------------------------------------    Patient's physical is up-to-date  Immunizations are up-to-date  Cancer screenings are up-to-date      Patient instructed on exercise  Patient instructed on healthy choices for diet      NEXT PHYSICAL 1 YEAR        The following portions of the patient's history were reviewed and updated as appropriate: allergies, current medications, past family history, past medical history, past social history, past surgical history and problem list.      OBJECTIVE:--------------------------------------------------------------------------------------------------  /80   Pulse 88   Temp 97.8 °F (36.6 °C) (Temporal)   Resp 15   Ht 5' 6\" (1.676 m)   Wt 88.7 kg (195 lb 8 oz)   SpO2 97%   BMI 31.55 kg/m²   Wt Readings from Last 3 Encounters:   01/25/24 88.7 kg (195 lb 8 oz)   01/04/24 88.6 kg (195 lb 4.8 oz)   08/29/23 82.6 kg (182 lb 3.2 oz)     BP Readings from Last 3 Encounters:   01/25/24 132/80   01/04/24 170/98   08/29/23 130/84     Pulse Readings from Last 3 Encounters:   01/25/24 88   01/04/24 74   08/29/23 61     Body mass index is 31.55 kg/m².  Health Maintenance   Topic Date Due    COVID-19 Vaccine (8 - 2023-24 season) 12/24/2023    Annual Physical  01/19/2024    HEMOGLOBIN A1C  02/23/2024    HIV Screening  01/19/2025 (Originally 2/22/1975)    Hepatitis C Screening  02/19/2025 (Originally 1960)    Kidney Health Evaluation: GFR  08/23/2024    Kidney Health Evaluation: Albumin/Creatinine Ratio  08/23/2024    Depression Screening  01/04/2025    Diabetic Foot Exam  01/25/2025    DM Eye Exam  02/10/2025    Colorectal Cancer Screening  04/09/2026    " DTaP,Tdap,and Td Vaccines (3 - Td or Tdap) 02/14/2033    Zoster Vaccine  Completed    Pneumococcal Vaccine: Pediatrics (0 to 5 Years) and At-Risk Patients (6 to 64 Years)  Completed    Influenza Vaccine  Completed    HIB Vaccine  Aged Out    IPV Vaccine  Aged Out    Hepatitis A Vaccine  Aged Out    Meningococcal ACWY Vaccine  Aged Out    HPV Vaccine  Aged Out         ROS:   12 point review of systems negative            PHYSICAL EXAM:    Gen.  No acute distress well-appearing well-nourished appears stated age    Mental status  Good judgment and insight oriented to time person and place, recent and remote memory intact mood and affect normal cooperative and patient is reasonable    HEENT  PERRLA 3 mm, EOMI without nystagmus, TMs clear, turbinates open pink no exudate, pharynx benign, tongue midline    Neck   supple no masses trachea midline positive click normal carotid upstrokes with no bruits    Cor  Regular rhythm without ectopy or murmur, no S3-S4, normal palpation that is no heave lift or thrill    Vascular  No edema, good pedal pulses    Lungs  CTA bilaterally in no respiratory distress no wheezes rhonchi or rales, normal to palpation no tactile fremitus    Abdomen  Soft, no palpable masses, no hepatosplenomegaly, normal bowel sounds, nontender    Lymphatics  No palpable nodes in the neck, supraclavicular area, axilla, or groin     Musculoskeletal  No clubbing cyanosis or edema muscle tone normal    Skin  no rashes or abnormal appearing lesions    Neuro  Normal ambulation, cranial nerves 2-12 grossly intact, higher functioning with reasoning intact.

## 2024-01-25 NOTE — PATIENT INSTRUCTIONS
Clean up your diet and when your calf is better start exercising  Continue metformin    Add ezetimibe to your rosuvastatin does not have to be taken at the same time dose daily    Blood work on or after April 8  Will be fasting with urine    We will see you after that or sooner if needed

## 2024-01-25 NOTE — PROGRESS NOTES
ASSESSMENT/PLAN:    Type 2 diabetes  A1c 7 from 6.1   We will keep the metformin the same  He will increase his exercise  He will decrease his processed carbs  We will check it again in 3 months    Hyperlipidemia  Despite increasing rosuvastatin from 5 to 10 mg not much is changed as his cholesterol is 178 from 173, triglycerides 156 from 174 and  from 100  We will keep the rosuvastatin at 10 mg and add ezetimibe and recheck in 3 months    Hypertension  Blood pressure 132/80  Continue lisinopril diet exercise and weight control    Continued pain in the left calf  To physical therapy for [place] and ultrasound    Vitamin D deficiency  Continue vitamin D daily and check before next visit    Erectile dysfunction  Trial Cialis daily      Blood work in 3 months  Office visit 4 months  Recheck sooner if needed              Health Maintenance   Topic Date Due    COVID-19 Vaccine (8 - 2023-24 season) 12/24/2023    Annual Physical  01/19/2024    HEMOGLOBIN A1C  02/23/2024    HIV Screening  01/19/2025 (Originally 2/22/1975)    Hepatitis C Screening  02/19/2025 (Originally 1960)    Kidney Health Evaluation: GFR  08/23/2024    Kidney Health Evaluation: Albumin/Creatinine Ratio  08/23/2024    Depression Screening  01/04/2025    Diabetic Foot Exam  01/25/2025    DM Eye Exam  02/10/2025    Colorectal Cancer Screening  04/09/2026    DTaP,Tdap,and Td Vaccines (3 - Td or Tdap) 02/14/2033    Zoster Vaccine  Completed    Pneumococcal Vaccine: Pediatrics (0 to 5 Years) and At-Risk Patients (6 to 64 Years)  Completed    Influenza Vaccine  Completed    HIB Vaccine  Aged Out    IPV Vaccine  Aged Out    Hepatitis A Vaccine  Aged Out    Meningococcal ACWY Vaccine  Aged Out    HPV Vaccine  Aged Out         Problem List as of 1/25/2024 Reviewed: 1/4/2024  3:05 PM by Ame Jones DO      Erectile dysfunction of non-organic origin    Mixed hyperlipidemia    Primary hypertension    Type 2 diabetes mellitus with hyperglycemia, without  long-term current use of insulin (HCC)    Vitamin D deficiency         Subjective:   Chief Complaint   Patient presents with    Hyperlipidemia    Hypertension    Diabetes     Patient is here for his physical and also for his recheck on his diabetes    For some reason he had got his blood work sent to him other than to's find no mistake of his    He freely admits that he was not totally great over the holiday season and also cannot exercise as much because of the pain in his gastrocnemius        patient ID: Chuck Cobos is a 63 y.o. male.    Patient's past medical history, surgical history, family history, social history, and Tobacco history reviewed with patient.     MED LIST WAS REVIEWED AND UPDATED    ROS  As per HPI  Rest of 12 point review of systems negative     Objective:      VITALS:  Wt Readings from Last 3 Encounters:   01/25/24 88.7 kg (195 lb 8 oz)   01/04/24 88.6 kg (195 lb 4.8 oz)   08/29/23 82.6 kg (182 lb 3.2 oz)     BP Readings from Last 3 Encounters:   01/25/24 132/80   01/04/24 170/98   08/29/23 130/84     Pulse Readings from Last 3 Encounters:   01/25/24 88   01/04/24 74   08/29/23 61     Body mass index is 31.55 kg/m².    Laboratory Results:   All pertinent labs and studies were reviewed with patient during this office visit with highlights of the results contained in this note in the ASSESSMENT AND PLAN section       Physical Exam      Gen.  No acute distress well-appearing well-nourished appears stated age    Mental status  Good judgment and insight oriented to time person and place, recent and remote memory intact mood and affect normal cooperative and patient is reasonable    HEENT  PERRLA 3 mm, EOMI without nystagmus, normocephalic atraumatic without facial weakness    Neck   supple no masses trachea midline positive click normal carotid upstrokes with no bruits    Cor  Regular rhythm without ectopy or murmur, no S3-S4, normal palpation that is no heave lift or thrill    Vascular  No  edema, good pedal pulses    Lungs  CTA bilaterally in no respiratory distress no wheezes rhonchi or rales, normal to palpation no tactile fremitus    Abdomen  Soft, no palpable masses, no hepatosplenomegaly, normal bowel sounds, nontender    Lymphatics  No palpable nodes in the neck, supraclavicular area, axilla, or groin    Musculoskeletal  No clubbing cyanosis or edema muscle tone normal    Skin  no rashes or abnormal appearing lesions    Neuro  Normal ambulation, cranial nerves 2-12 grossly intact, higher functioning with reasoning intact.

## 2024-01-25 NOTE — PROGRESS NOTES
Diabetic Foot Exam    Patient's shoes and socks removed.    Right Foot/Ankle   Right Foot Inspection  Skin Exam: skin normal and skin intact. No dry skin, no warmth, no callus, no erythema, no maceration, no abnormal color, no pre-ulcer, no ulcer and no callus.     Toe Exam: ROM and strength within normal limits.     Sensory   Monofilament testing: intact    Vascular  The right DP pulse is 2+. The right PT pulse is 2+.     Left Foot/Ankle  Left Foot Inspection  Skin Exam: skin normal and skin intact. No dry skin, no warmth, no erythema, no maceration, normal color, no pre-ulcer, no ulcer and no callus.     Toe Exam: ROM and strength within normal limits.     Sensory   Monofilament testing: intact    Vascular  The left DP pulse is 2+. The left PT pulse is 2+.     Assign Risk Category  No deformity present  No loss of protective sensation  No weak pulses  Risk: 0

## 2024-01-29 DIAGNOSIS — N52.8 OTHER MALE ERECTILE DYSFUNCTION: ICD-10-CM

## 2024-01-31 ENCOUNTER — EVALUATION (OUTPATIENT)
Dept: PHYSICAL THERAPY | Facility: MEDICAL CENTER | Age: 64
End: 2024-01-31

## 2024-01-31 DIAGNOSIS — M79.662 PAIN OF LEFT CALF: Primary | ICD-10-CM

## 2024-01-31 PROCEDURE — 97161 PT EVAL LOW COMPLEX 20 MIN: CPT | Performed by: PHYSICAL THERAPIST

## 2024-01-31 RX ORDER — TADALAFIL 5 MG/1
5 TABLET ORAL DAILY PRN
Qty: 90 TABLET | Refills: 0 | Status: SHIPPED | OUTPATIENT
Start: 2024-01-31

## 2024-01-31 NOTE — PROGRESS NOTES
PT Evaluation     Today's date: 2024  Patient name: Chuck Cobos  : 1960  MRN: [id number]  Referring provider: Ame Jones DO  Dx:   Encounter Diagnosis     ICD-10-CM    1. Pain of left calf  M79.662 Ambulatory referral to Physical Therapy                     Assessment  Assessment details: Chuck Cobos is a 63 y.o. male presents with Pain of left calf 2* medial gastroc strain on 1/3/24. Chuck Cobos has the below listed impairments and will benefit from skilled PT to improve deficits to return to prior level of function.   Impairments: abnormal muscle firing, abnormal or restricted ROM, impaired physical strength and pain with function  Understanding of Dx/Px/POC: good   Prognosis: good    Goals  Impairment Goals  - Decrease pain to 0-2/10  - Improve flexibility equal to contralateral side  - Increase strength equal to contralateral side    Functional Goals  - Patient will be independent with comprehensive HEP  - Ambulation is improved to prior level of function        Plan  Patient would benefit from: skilled PT  Referral necessary: No  Planned therapy interventions: home exercise program, manual therapy, neuromuscular re-education, patient education, functional ROM exercises, strengthening, stretching and joint mobilization  Frequency: 1x week  Duration in weeks: 8  Treatment plan discussed with: patient        Subjective Evaluation    History of Present Illness  Mechanism of injury: Chuck reports he had tightness in L calf for Dec. But on  when he was running on the treadmill, he started to feel like his calf locked up. He has not been running since injury. He has walked about 3 miles but feels tightness and soreness.   Patient Goals  Patient goals for therapy: decreased pain    Pain  At best pain ratin  At worst pain rating: 3          Objective     Palpation   Left   No palpable tenderness to the lateral gastrocnemius and soleus.   Muscle spasm in the medial gastrocnemius.  "  Tenderness of the medial gastrocnemius.     Active Range of Motion   Left Ankle/Foot   Normal active range of motion    Right Ankle/Foot   Normal active range of motion    Joint Play   Left Ankle/Foot  Hypomobile in the talocrural joint.     Right Ankle/Foot  Joints within functional limits are the talocrural joint.     Strength/Myotome Testing     Left Ankle/Foot   Dorsiflexion: 5  Plantar flexion: 4-  Inversion: 5  Eversion: 5    Right Ankle/Foot   Dorsiflexion: 5  Plantar flexion: 5  Inversion: 5  Eversion: 5    Additional Strength Details  Dec height on L for DL heel raise             Precautions: none      Manuals 1/31            STM L gastroc JE            juan josé jarvis             L TC mobs             Neuro Re-Ed                                                                                                        Ther Ex             Ankle DF/PF 30            Seated HS stretch 30\"x3            Gastroc stretch NWB             Soleus stretch 1/2 foam             Gastroc stretch 1/2 foam             Seated heel raises             Heel raises                          Ther Activity                                       Gait Training                                       Modalities                                            "

## 2024-02-07 ENCOUNTER — OFFICE VISIT (OUTPATIENT)
Dept: PHYSICAL THERAPY | Facility: MEDICAL CENTER | Age: 64
End: 2024-02-07

## 2024-02-07 DIAGNOSIS — M79.662 PAIN OF LEFT CALF: Primary | ICD-10-CM

## 2024-02-07 DIAGNOSIS — I10 PRIMARY HYPERTENSION: ICD-10-CM

## 2024-02-07 DIAGNOSIS — E11.65 TYPE 2 DIABETES MELLITUS WITH HYPERGLYCEMIA, WITHOUT LONG-TERM CURRENT USE OF INSULIN (HCC): ICD-10-CM

## 2024-02-07 DIAGNOSIS — E78.2 MIXED HYPERLIPIDEMIA: ICD-10-CM

## 2024-02-07 PROCEDURE — 97140 MANUAL THERAPY 1/> REGIONS: CPT | Performed by: PHYSICAL THERAPIST

## 2024-02-07 PROCEDURE — 97110 THERAPEUTIC EXERCISES: CPT | Performed by: PHYSICAL THERAPIST

## 2024-02-07 RX ORDER — ROSUVASTATIN CALCIUM 10 MG/1
10 TABLET, COATED ORAL EVERY EVENING
Qty: 90 TABLET | Refills: 0 | Status: SHIPPED | OUTPATIENT
Start: 2024-02-07

## 2024-02-07 RX ORDER — METFORMIN HYDROCHLORIDE 500 MG/1
500 TABLET, EXTENDED RELEASE ORAL 2 TIMES DAILY WITH MEALS
Qty: 180 TABLET | Refills: 0 | Status: SHIPPED | OUTPATIENT
Start: 2024-02-07

## 2024-02-07 RX ORDER — LISINOPRIL 10 MG/1
10 TABLET ORAL
Qty: 90 TABLET | Refills: 0 | Status: SHIPPED | OUTPATIENT
Start: 2024-02-07

## 2024-02-07 RX ORDER — EZETIMIBE 10 MG/1
10 TABLET ORAL DAILY
Qty: 90 TABLET | Refills: 0 | Status: SHIPPED | OUTPATIENT
Start: 2024-02-07 | End: 2025-02-01

## 2024-02-07 NOTE — PROGRESS NOTES
"Daily Note     Today's date: 2024  Patient name: Chuck Cobos  : 1960  MRN: [id number]  Referring provider: Ame Jones DO  Dx:   Encounter Diagnosis     ICD-10-CM    1. Pain of left calf  M79.662                      Subjective: Chuck reports he has not been having pain in his calf      Objective: See treatment diary below      Assessment: Tolerated treatment well. Patient exhibited good technique with therapeutic exercises      Plan: Progress treatment as tolerated.  Chuck is going away for a week and will continue with HEP and going for walks as tolerated. If remains pain-free at [place], can begin return to running program.      Precautions: none      Manuals            STM L gastroc JE JE           graston             laser  JE           L TC mobs  JE gr II           Neuro Re-Ed                                                                                                        Ther Ex             Ankle DF/PF 30            Seated HS stretch 30\"x3            Gastroc stretch NWB gr strap  30\"x3           Soleus stretch   Did not feel           Gastroc stretch wall  30\"x3           Seated heel raises  3x10           Heel raises  3x10           Supine HS stretch  30\"x3           Ther Activity                                       Gait Training                                       Modalities                                            "

## 2024-02-23 ENCOUNTER — OFFICE VISIT (OUTPATIENT)
Dept: PHYSICAL THERAPY | Facility: MEDICAL CENTER | Age: 64
End: 2024-02-23

## 2024-02-23 DIAGNOSIS — M79.662 PAIN OF LEFT CALF: Primary | ICD-10-CM

## 2024-02-23 PROCEDURE — 97140 MANUAL THERAPY 1/> REGIONS: CPT | Performed by: PHYSICAL THERAPIST

## 2024-02-23 NOTE — PROGRESS NOTES
"Daily Note     Today's date: 2024  Patient name: Chuck Cobos  : 1960  MRN: [id number]  Referring provider: Ame Jones DO  Dx:   Encounter Diagnosis     ICD-10-CM    1. Pain of left calf  M79.662                      Subjective: Chuck reports he walked over 20,000 for 3 days at [place] and now has some soreness in his calf. Prior to [place], he was pain-free.       Objective: See treatment diary below      Assessment: Tolerated treatment well. Patient would benefit from continued PT      Plan: Progress treatment as tolerated.  Discussed continue with HEP and if pain-free in 1.5 weeks, will discuss return to run program.      Precautions: none      Manuals           STM L gastroc JE JE JE          graston             laser  JE JE          L TC mobs  JE gr II           Neuro Re-Ed                                                                                                        Ther Ex             Ankle DF/PF 30            Seated HS stretch 30\"x3            Gastroc stretch NWB gr strap  30\"x3           Soleus stretch   Did not feel           Gastroc stretch wall  30\"x3           Seated heel raises  3x10           Heel raises  3x10           Supine HS stretch  30\"x3           Ther Activity                                       Gait Training                                       Modalities                                              "

## 2024-02-27 LAB
LEFT EYE DIABETIC RETINOPATHY: NORMAL
RIGHT EYE DIABETIC RETINOPATHY: NORMAL

## 2024-03-04 ENCOUNTER — OFFICE VISIT (OUTPATIENT)
Dept: PHYSICAL THERAPY | Facility: MEDICAL CENTER | Age: 64
End: 2024-03-04

## 2024-03-04 DIAGNOSIS — M79.662 PAIN OF LEFT CALF: Primary | ICD-10-CM

## 2024-03-04 PROCEDURE — 97140 MANUAL THERAPY 1/> REGIONS: CPT | Performed by: PHYSICAL THERAPIST

## 2024-03-04 NOTE — PROGRESS NOTES
"Daily Note     Today's date: 3/4/2024  Patient name: Chuck Cobos  : 1960  MRN: [id number]  Referring provider: Ame Jones DO  Dx:   Encounter Diagnosis     ICD-10-CM    1. Pain of left calf  M79.662                      Subjective: Chuck reports he has been feeling good over the last few weeks. He had a little soreness after 4.5 mile walk yesterday.       Objective: See treatment diary below      Assessment: Tolerated treatment well. Patient would benefit from continued PT      Plan:  Discussed patient performing return to run program in stepwise order starting in Tier 1 at level 1. Educated patient to perform each level 2x without soreness or pain and then proceed to next level.      Precautions: none      Manuals 1/31 2/7 2/23 3/4         STM L gastroc AVRIL MACKENZIE         grasmesha             laser  AVRIL MACKENZIE JE         L TC mobs  JE gr II           Neuro Re-Ed                                                                                                        Ther Ex             Ankle DF/PF 30            Seated HS stretch 30\"x3            Gastroc stretch NWB gr strap  30\"x3           Soleus stretch   Did not feel           Gastroc stretch wall  30\"x3           Seated heel raises  3x10           Heel raises  3x10           Supine HS stretch  30\"x3           Ther Activity                                       Gait Training                                       Modalities                                                "

## 2024-03-12 DIAGNOSIS — N52.8 OTHER MALE ERECTILE DYSFUNCTION: ICD-10-CM

## 2024-03-12 DIAGNOSIS — E78.2 MIXED HYPERLIPIDEMIA: ICD-10-CM

## 2024-03-12 DIAGNOSIS — I10 PRIMARY HYPERTENSION: ICD-10-CM

## 2024-03-12 DIAGNOSIS — E11.65 TYPE 2 DIABETES MELLITUS WITH HYPERGLYCEMIA, WITHOUT LONG-TERM CURRENT USE OF INSULIN (HCC): ICD-10-CM

## 2024-03-12 RX ORDER — TADALAFIL 5 MG/1
5 TABLET ORAL DAILY PRN
Qty: 90 TABLET | Refills: 0 | Status: CANCELLED | OUTPATIENT
Start: 2024-03-12

## 2024-03-12 RX ORDER — EZETIMIBE 10 MG/1
10 TABLET ORAL DAILY
Qty: 90 TABLET | Refills: 0 | Status: CANCELLED | OUTPATIENT
Start: 2024-03-12 | End: 2025-03-07

## 2024-03-12 RX ORDER — ROSUVASTATIN CALCIUM 10 MG/1
10 TABLET, COATED ORAL EVERY EVENING
Qty: 90 TABLET | Refills: 0 | Status: CANCELLED | OUTPATIENT
Start: 2024-03-12

## 2024-03-12 RX ORDER — LISINOPRIL 10 MG/1
10 TABLET ORAL
Qty: 90 TABLET | Refills: 0 | Status: CANCELLED | OUTPATIENT
Start: 2024-03-12

## 2024-03-12 RX ORDER — METFORMIN HYDROCHLORIDE 500 MG/1
500 TABLET, EXTENDED RELEASE ORAL 2 TIMES DAILY WITH MEALS
Qty: 180 TABLET | Refills: 0 | Status: CANCELLED | OUTPATIENT
Start: 2024-03-12

## 2024-03-18 ENCOUNTER — OFFICE VISIT (OUTPATIENT)
Dept: PHYSICAL THERAPY | Facility: MEDICAL CENTER | Age: 64
End: 2024-03-18

## 2024-03-18 DIAGNOSIS — M79.662 PAIN OF LEFT CALF: Primary | ICD-10-CM

## 2024-03-18 PROCEDURE — 97140 MANUAL THERAPY 1/> REGIONS: CPT | Performed by: PHYSICAL THERAPIST

## 2024-03-18 NOTE — PROGRESS NOTES
"Daily Note     Today's date: 3/18/2024  Patient name: Chuck Cobos  : 1960  MRN: [id number]  Referring provider: Ame Jones DO  Dx:   Encounter Diagnosis     ICD-10-CM    1. Pain of left calf  M79.662                      Subjective: Chuck reports he worked last week and had to wear still tipped shoes so he noted some fatigue in his calf muscle.       Objective: See treatment diary below      Assessment: Tolerated treatment well. Patient would benefit from continued PT      Plan: Progress treatment as tolerated.       Precautions: none      Manuals 1/31 2/7 2/23 3/4 3/18        STM L gastroc AVRIL MACKENZIE        graston             laser  AVRIL MACKENZIE JE        L TC mobs  JE gr II           Neuro Re-Ed                                                                                                        Ther Ex             Ankle DF/PF 30            Seated HS stretch 30\"x3            Gastroc stretch NWB gr strap  30\"x3           Soleus stretch   Did not feel           Gastroc stretch wall  30\"x3           Seated heel raises  3x10           Heel raises  3x10           Supine HS stretch  30\"x3           Ther Activity                                       Gait Training                                       Modalities                                                  "

## 2024-03-19 DIAGNOSIS — F52.21 ERECTILE DYSFUNCTION OF NON-ORGANIC ORIGIN: Primary | ICD-10-CM

## 2024-03-19 RX ORDER — SILDENAFIL 100 MG/1
TABLET, FILM COATED ORAL
Qty: 10 TABLET | Refills: 5 | Status: SHIPPED | OUTPATIENT
Start: 2024-03-19

## 2024-03-29 ENCOUNTER — OFFICE VISIT (OUTPATIENT)
Dept: PHYSICAL THERAPY | Facility: MEDICAL CENTER | Age: 64
End: 2024-03-29

## 2024-03-29 DIAGNOSIS — M79.662 PAIN OF LEFT CALF: Primary | ICD-10-CM

## 2024-03-29 PROCEDURE — 97140 MANUAL THERAPY 1/> REGIONS: CPT | Performed by: PHYSICAL THERAPIST

## 2024-03-29 NOTE — PROGRESS NOTES
"Daily Note     Today's date: 3/29/2024  Patient name: Chuck Cobos  : 1960  MRN: [id number]  Referring provider: Ame Jones DO  Dx:   Encounter Diagnosis     ICD-10-CM    1. Pain of left calf  M79.662                      Subjective: Chuck reports he ran 4 times since last visit and he had mild soreness but feels it went pretty well      Objective: See treatment diary below      Assessment: Tolerated treatment well.       Plan:  Chuck is going to continue with return to run program and will call if he fails to be able to progress through program     Precautions: none      Manuals 1/31 2/7 2/23 3/4 3/18 3/29       STM L gastroc JE JE JE JE JE JE       graston             laser  JE JE JE JE JE       L TC mobs  JE gr II           Neuro Re-Ed                                                                                                        Ther Ex             Ankle DF/PF 30            Seated HS stretch 30\"x3            Gastroc stretch NWB gr strap  30\"x3           Soleus stretch   Did not feel           Gastroc stretch wall  30\"x3           Seated heel raises  3x10           Heel raises  3x10           Supine HS stretch  30\"x3           Ther Activity                                       Gait Training                                       Modalities                                                    "

## 2024-04-02 ENCOUNTER — TELEPHONE (OUTPATIENT)
Dept: FAMILY MEDICINE CLINIC | Facility: CLINIC | Age: 64
End: 2024-04-02

## 2024-04-02 NOTE — TELEPHONE ENCOUNTER
Patient wrote a MyChart message stating he would like to switch the pharmacy from [facility] to [facility] for his Sildenafil for now on.     [facility], [phone]  ADDRESS [facility], [address] [place] [address], Phone [phone]

## 2024-05-21 LAB
25(OH)D3 SERPL-MCNC: 44 NG/ML (ref 30–100)
ALBUMIN SERPL-MCNC: 4.3 G/DL (ref 3.6–5.1)
ALBUMIN/CREAT UR: 15 MG/G CREAT
ALBUMIN/GLOB SERPL: 1.7 (CALC) (ref 1–2.5)
ALP SERPL-CCNC: 58 U/L (ref 35–144)
ALT SERPL-CCNC: 23 U/L (ref 9–46)
APPEARANCE UR: CLEAR
AST SERPL-CCNC: 18 U/L (ref 10–35)
BASOPHILS # BLD AUTO: 82 CELLS/UL (ref 0–200)
BASOPHILS NFR BLD AUTO: 1.2 %
BILIRUB SERPL-MCNC: 0.5 MG/DL (ref 0.2–1.2)
BILIRUB UR QL STRIP: NEGATIVE
BUN SERPL-MCNC: 17 MG/DL (ref 7–25)
BUN/CREAT SERPL: ABNORMAL (CALC) (ref 6–22)
CALCIUM SERPL-MCNC: 9.6 MG/DL (ref 8.6–10.3)
CHLORIDE SERPL-SCNC: 104 MMOL/L (ref 98–110)
CHOLEST SERPL-MCNC: 128 MG/DL
CHOLEST/HDLC SERPL: 2.9 (CALC)
CO2 SERPL-SCNC: 27 MMOL/L (ref 20–32)
COLOR UR: YELLOW
CREAT SERPL-MCNC: 1.02 MG/DL (ref 0.7–1.35)
CREAT UR-MCNC: 234 MG/DL (ref 20–320)
EOSINOPHIL # BLD AUTO: 503 CELLS/UL (ref 15–500)
EOSINOPHIL NFR BLD AUTO: 7.4 %
ERYTHROCYTE [DISTWIDTH] IN BLOOD BY AUTOMATED COUNT: 12.1 % (ref 11–15)
EST. AVERAGE GLUCOSE BLD GHB EST-MCNC: 204 MG/DL (CALC)
GFR/BSA.PRED SERPLBLD CYS-BASED-ARV: 82 ML/MIN/1.73M2
GLOBULIN SER CALC-MCNC: 2.5 G/DL (CALC) (ref 1.9–3.7)
GLUCOSE SERPL-MCNC: 141 MG/DL (ref 65–99)
GLUCOSE UR QL STRIP: NEGATIVE
HBA1C MFR BLD: 7.9 % OF TOTAL HGB
HCT VFR BLD AUTO: 50 % (ref 38.5–50)
HDLC SERPL-MCNC: 44 MG/DL
HGB BLD-MCNC: 16.3 G/DL (ref 13.2–17.1)
HGB UR QL STRIP: NEGATIVE
KETONES UR QL STRIP: ABNORMAL
LDLC SERPL CALC-MCNC: 63 MG/DL (CALC)
LEUKOCYTE ESTERASE UR QL STRIP: NEGATIVE
LYMPHOCYTES # BLD AUTO: 2026 CELLS/UL (ref 850–3900)
LYMPHOCYTES NFR BLD AUTO: 29.8 %
MCH RBC QN AUTO: 29.3 PG (ref 27–33)
MCHC RBC AUTO-ENTMCNC: 32.6 G/DL (ref 32–36)
MCV RBC AUTO: 89.9 FL (ref 80–100)
MICROALBUMIN UR-MCNC: 3.4 MG/DL
MONOCYTES # BLD AUTO: 571 CELLS/UL (ref 200–950)
MONOCYTES NFR BLD AUTO: 8.4 %
NEUTROPHILS # BLD AUTO: 3618 CELLS/UL (ref 1500–7800)
NEUTROPHILS NFR BLD AUTO: 53.2 %
NITRITE UR QL STRIP: NEGATIVE
NONHDLC SERPL-MCNC: 84 MG/DL (CALC)
PH UR STRIP: 5.5 [PH] (ref 5–8)
PLATELET # BLD AUTO: 235 THOUSAND/UL (ref 140–400)
PMV BLD REES-ECKER: 10.9 FL (ref 7.5–12.5)
POTASSIUM SERPL-SCNC: 4.4 MMOL/L (ref 3.5–5.3)
PROT SERPL-MCNC: 6.8 G/DL (ref 6.1–8.1)
PROT UR QL STRIP: NEGATIVE
PSA SERPL-MCNC: 1.26 NG/ML
RBC # BLD AUTO: 5.56 MILLION/UL (ref 4.2–5.8)
SODIUM SERPL-SCNC: 141 MMOL/L (ref 135–146)
SP GR UR STRIP: 1.02 (ref 1–1.03)
TRIGL SERPL-MCNC: 130 MG/DL
TSH SERPL-ACNC: 1.87 MIU/L (ref 0.4–4.5)
WBC # BLD AUTO: 6.8 THOUSAND/UL (ref 3.8–10.8)

## 2024-05-23 ENCOUNTER — OFFICE VISIT (OUTPATIENT)
Dept: FAMILY MEDICINE CLINIC | Facility: CLINIC | Age: 64
End: 2024-05-23
Payer: COMMERCIAL

## 2024-05-23 VITALS
WEIGHT: 198.9 LBS | DIASTOLIC BLOOD PRESSURE: 78 MMHG | SYSTOLIC BLOOD PRESSURE: 124 MMHG | RESPIRATION RATE: 18 BRPM | OXYGEN SATURATION: 97 % | HEIGHT: 66 IN | HEART RATE: 75 BPM | BODY MASS INDEX: 31.97 KG/M2

## 2024-05-23 DIAGNOSIS — E78.2 MIXED HYPERLIPIDEMIA: ICD-10-CM

## 2024-05-23 DIAGNOSIS — E11.65 TYPE 2 DIABETES MELLITUS WITH HYPERGLYCEMIA, WITHOUT LONG-TERM CURRENT USE OF INSULIN (HCC): ICD-10-CM

## 2024-05-23 DIAGNOSIS — I10 PRIMARY HYPERTENSION: ICD-10-CM

## 2024-05-23 DIAGNOSIS — Z23 ENCOUNTER FOR IMMUNIZATION: ICD-10-CM

## 2024-05-23 DIAGNOSIS — E55.9 VITAMIN D DEFICIENCY: ICD-10-CM

## 2024-05-23 DIAGNOSIS — E11.65 TYPE 2 DIABETES MELLITUS WITH HYPERGLYCEMIA, WITHOUT LONG-TERM CURRENT USE OF INSULIN (HCC): Primary | ICD-10-CM

## 2024-05-23 DIAGNOSIS — F52.21 ERECTILE DYSFUNCTION OF NON-ORGANIC ORIGIN: ICD-10-CM

## 2024-05-23 PROCEDURE — 90678 RSV VACC PREF BIVALENT IM: CPT

## 2024-05-23 PROCEDURE — 99214 OFFICE O/P EST MOD 30 MIN: CPT | Performed by: FAMILY MEDICINE

## 2024-05-23 PROCEDURE — 90471 IMMUNIZATION ADMIN: CPT

## 2024-05-23 RX ORDER — LISINOPRIL 10 MG/1
10 TABLET ORAL
Qty: 90 TABLET | Refills: 0 | Status: SHIPPED | OUTPATIENT
Start: 2024-05-23

## 2024-05-23 RX ORDER — GLYBURIDE-METFORMIN HYDROCHLORIDE 5; 500 MG/1; MG/1
1 TABLET ORAL 2 TIMES DAILY WITH MEALS
Qty: 100 TABLET | Refills: 3 | Status: SHIPPED | OUTPATIENT
Start: 2024-05-23 | End: 2024-05-27

## 2024-05-23 RX ORDER — SILDENAFIL 100 MG/1
TABLET, FILM COATED ORAL
Qty: 10 TABLET | Refills: 5 | Status: SHIPPED | OUTPATIENT
Start: 2024-05-23

## 2024-05-23 RX ORDER — EZETIMIBE AND SIMVASTATIN 10; 80 MG/1; MG/1
1 TABLET ORAL
Qty: 90 TABLET | Refills: 3 | Status: SHIPPED | OUTPATIENT
Start: 2024-05-23

## 2024-05-23 NOTE — PATIENT INSTRUCTIONS
Stop metformin  Begin glimepiride metformin 5/500*one twice daily    Stop ezetimibe and rosuvastatin  Begin simvastatin ezetimibe 80/10 one each evening    Continue lisinopril 1 each evening    fasting labs to check cholesterol and A1c in 3 months  Recheck visit afterwards

## 2024-05-23 NOTE — PROGRESS NOTES
ASSESSMENT/PLAN:    Type 2 diabetes  A1c 7.9 from 7.0 from 6.1  Stop metformin and instead do glyburide/metformin 5/500 twice daily  Check A1c in 3 months    Hyperlipidemia  Cholesterol excellent 128 with a LDL 63  Stop ezetimibe and rosuvastatin  And simvastatin ezetimibe 80/10  Recheck labs in 3 months, lipids      Hypertension  Blood pressure good at 124/78  Continue lisinopril diet exercise and weight control     Vitamin D deficiency  Continue vitamin D daily   Vitamin D is normal     Erectile dysfunction  Cialis daily        Blood work in 3 months  Office visit 4 months  Recheck sooner if needed                Health Maintenance   Topic Date Due    PT PLAN OF CARE  Never done    RSV Vaccine Age 60+ Years (1 - 1-dose 60+ series) Never done    HIV Screening  01/19/2025 (Originally 2/22/1975)    Hepatitis C Screening  02/19/2025 (Originally 1960)    HEMOGLOBIN A1C  11/20/2024    Depression Screening  01/04/2025    Annual Physical  01/25/2025    Diabetic Foot Exam  01/25/2025    Kidney Health Evaluation: GFR  05/20/2025    Kidney Health Evaluation: Albumin/Creatinine Ratio  05/20/2025    DM Eye Exam  02/27/2026    Colorectal Cancer Screening  04/09/2026    DTaP,Tdap,and Td Vaccines (3 - Td or Tdap) 02/14/2033    Zoster Vaccine  Completed    Pneumococcal Vaccine: Pediatrics (0 to 5 Years) and At-Risk Patients (6 to 64 Years)  Completed    Influenza Vaccine  Completed    COVID-19 Vaccine  Completed    RSV Vaccine age 0-20 Months  Aged Out    HIB Vaccine  Aged Out    IPV Vaccine  Aged Out    Hepatitis A Vaccine  Aged Out    Meningococcal ACWY Vaccine  Aged Out    HPV Vaccine  Aged Out         Problem List as of 5/23/2024 Reviewed: 1/25/2024  9:12 AM by Ame Jones DO      Erectile dysfunction of non-organic origin    Mixed hyperlipidemia    Primary hypertension    Type 2 diabetes mellitus with hyperglycemia, without long-term current use of insulin (HCC)    Vitamin D deficiency         Subjective:   Chief  Complaint   Patient presents with    Vitamin D Deficiency    Hypertension    Hyperlipidemia    Diabetes     Patient is here to recheck his diabetes and blood pressure    Unfortunately is a rough quarter with travel and eating on the road    Patient also has a list of medications through [facility] that are only $5 a prescription that he would like to have considered in order to afford some cost savings.        patient ID: Chuck Cobos is a 64 y.o. male.    Patient's past medical history, surgical history, family history, social history, and Tobacco history reviewed with patient.     MED LIST WAS REVIEWED AND UPDATED    ROS  As per HPI  Rest of 12 point review of systems negative     Objective:      VITALS:  Wt Readings from Last 3 Encounters:   05/23/24 90.2 kg (198 lb 14.4 oz)   01/25/24 88.7 kg (195 lb 8 oz)   01/04/24 88.6 kg (195 lb 4.8 oz)     BP Readings from Last 3 Encounters:   05/23/24 124/78   01/25/24 132/80   01/04/24 170/98     Pulse Readings from Last 3 Encounters:   05/23/24 75   01/25/24 88   01/04/24 74     Body mass index is 32.1 kg/m².    Laboratory Results:   All pertinent labs and studies were reviewed with patient during this office visit with highlights of the results contained in this note in the ASSESSMENT AND PLAN section       Physical Exam  General  Patient in no acute distress, well appearing, well nourished and appears stated age    Mental status  Good judgment and insight, oriented to time person and place, recent and remote memory is intact, mood and affect are normal, cooperative, and patient is reasonable.

## 2024-05-27 RX ORDER — GLYBURIDE-METFORMIN HYDROCHLORIDE 5; 500 MG/1; MG/1
1 TABLET ORAL 2 TIMES DAILY WITH MEALS
Qty: 100 TABLET | Refills: 3 | Status: SHIPPED | OUTPATIENT
Start: 2024-05-27

## 2024-08-09 DIAGNOSIS — E11.65 TYPE 2 DIABETES MELLITUS WITH HYPERGLYCEMIA, WITHOUT LONG-TERM CURRENT USE OF INSULIN (HCC): ICD-10-CM

## 2024-08-09 DIAGNOSIS — I10 PRIMARY HYPERTENSION: ICD-10-CM

## 2024-08-09 RX ORDER — LISINOPRIL 10 MG/1
10 TABLET ORAL
Qty: 30 TABLET | Refills: 5 | Status: SHIPPED | OUTPATIENT
Start: 2024-08-09

## 2024-09-19 LAB
CHOLEST SERPL-MCNC: 113 MG/DL
CHOLEST/HDLC SERPL: 2.5 (CALC)
HBA1C MFR BLD: 6.8 % OF TOTAL HGB
HDLC SERPL-MCNC: 46 MG/DL
LDLC SERPL CALC-MCNC: 47 MG/DL (CALC)
NONHDLC SERPL-MCNC: 67 MG/DL (CALC)
TRIGL SERPL-MCNC: 113 MG/DL

## 2024-09-23 ENCOUNTER — OFFICE VISIT (OUTPATIENT)
Dept: FAMILY MEDICINE CLINIC | Facility: CLINIC | Age: 64
End: 2024-09-23
Payer: COMMERCIAL

## 2024-09-23 VITALS
HEIGHT: 66 IN | DIASTOLIC BLOOD PRESSURE: 80 MMHG | HEART RATE: 70 BPM | WEIGHT: 204.6 LBS | SYSTOLIC BLOOD PRESSURE: 120 MMHG | OXYGEN SATURATION: 98 % | RESPIRATION RATE: 15 BRPM | BODY MASS INDEX: 32.88 KG/M2

## 2024-09-23 DIAGNOSIS — E78.2 MIXED HYPERLIPIDEMIA: ICD-10-CM

## 2024-09-23 DIAGNOSIS — E11.65 TYPE 2 DIABETES MELLITUS WITH HYPERGLYCEMIA, WITHOUT LONG-TERM CURRENT USE OF INSULIN (HCC): Primary | ICD-10-CM

## 2024-09-23 DIAGNOSIS — Z23 NEED FOR COVID-19 VACCINE: ICD-10-CM

## 2024-09-23 DIAGNOSIS — Z23 NEED FOR VACCINATION: ICD-10-CM

## 2024-09-23 DIAGNOSIS — I10 PRIMARY HYPERTENSION: ICD-10-CM

## 2024-09-23 DIAGNOSIS — E55.9 VITAMIN D DEFICIENCY: ICD-10-CM

## 2024-09-23 PROCEDURE — 90480 ADMN SARSCOV2 VAC 1/ONLY CMP: CPT

## 2024-09-23 PROCEDURE — 99214 OFFICE O/P EST MOD 30 MIN: CPT | Performed by: FAMILY MEDICINE

## 2024-09-23 PROCEDURE — 90673 RIV3 VACCINE NO PRESERV IM: CPT

## 2024-09-23 PROCEDURE — 91320 SARSCV2 VAC 30MCG TRS-SUC IM: CPT

## 2024-09-23 PROCEDURE — 90471 IMMUNIZATION ADMIN: CPT

## 2024-09-23 NOTE — PROGRESS NOTES
ASSESSMENT/PLAN:    Ear discomfort  Secondary to cerumen impaction  Irrigation and curettage    2 diabetes  A1c improved 6.8 from 7.9  Weight went up 6 pounds with patient admits to dietary indiscretions  Continued glyburide/metformin 5/500 twice daily  Recheck A1c again in 3 months     Hyperlipidemia  Cholesterol excellent at 113 with a LDL of 47  Continue simvastatin ezetimibe 80/10 also known as Vytorin      Hypertension  Blood pressure good at 120/80  Continue lisinopril diet exercise and weight control     Vitamin D deficiency  Continue vitamin D daily      Erectile dysfunction  Sildenafil as needed         Blood work in 3 months  Office visit 4 months  Recheck sooner if needed          Health Maintenance   Topic Date Due    PT PLAN OF CARE  Never done    HIV Screening  01/19/2025 (Originally 2/22/1975)    Hepatitis C Screening  02/19/2025 (Originally 1960)    Annual Physical  01/25/2025    Diabetic Foot Exam  01/25/2025    HEMOGLOBIN A1C  03/18/2025    Kidney Health Evaluation: GFR  05/20/2025    Kidney Health Evaluation: Albumin/Creatinine Ratio  05/20/2025    Depression Screening  09/23/2025    DM Eye Exam  02/27/2026    Colorectal Cancer Screening  04/09/2026    DTaP,Tdap,and Td Vaccines (3 - Td or Tdap) 02/14/2033    RSV Vaccine Age 60+ Years  Completed    Zoster Vaccine  Completed    Pneumococcal Vaccine: Pediatrics (0 to 5 Years) and At-Risk Patients (6 to 64 Years)  Completed    Influenza Vaccine  Completed    COVID-19 Vaccine  Completed    RSV Vaccine age 0-20 Months  Aged Out    HIB Vaccine  Aged Out    IPV Vaccine  Aged Out    Hepatitis A Vaccine  Aged Out    Meningococcal ACWY Vaccine  Aged Out    HPV Vaccine  Aged Out         Problem List as of 9/23/2024 Reviewed: 5/23/2024 10:53 AM by Ame Jones DO      Erectile dysfunction of non-organic origin    Mixed hyperlipidemia    Primary hypertension    Type 2 diabetes mellitus with hyperglycemia, without long-term current use of insulin (HCC)     Vitamin D deficiency         Subjective:   Chief Complaint   Patient presents with    Vitamin D Deficiency    Hypertension    Diabetes     Patient here for his recheck on his diabetes and to go over his blood work  Also had his flu and COVID vaccines today    Has a feeling of liquid in his left ear not the right  Also if he only eats cereal in the morning before going on a 3-1/2 mile walk he comes back very dizzy  Does not check his blood sugars at this time        patient ID: Chuck Cobos is a 64 y.o. male.    Patient's past medical history, surgical history, family history, social history, and Tobacco history reviewed with patient.     MED LIST WAS REVIEWED AND UPDATED    ROS  As per HPI  Rest of 12 point review of systems negative     Objective:      VITALS:  Wt Readings from Last 3 Encounters:   09/23/24 92.8 kg (204 lb 9.6 oz)   05/23/24 90.2 kg (198 lb 14.4 oz)   01/25/24 88.7 kg (195 lb 8 oz)     BP Readings from Last 3 Encounters:   09/23/24 120/80   05/23/24 124/78   01/25/24 132/80     Pulse Readings from Last 3 Encounters:   09/23/24 70   05/23/24 75   01/25/24 88     Body mass index is 33.02 kg/m².    Laboratory Results:   All pertinent labs and studies were reviewed with patient during this office visit with highlights of the results contained in this note in the ASSESSMENT AND PLAN section       Physical Exam    General  Patient in no acute distress, well appearing, well nourished and appears stated age    Mental status  Good judgment and insight, oriented to time person and place, recent and remote memory is intact, mood and affect are normal, cooperative, and patient is reasonable.    HEENT  Right TM is clear left TM has a ring of cerumen but also is impacted with soft cerumen precleaning

## 2024-10-08 DIAGNOSIS — E11.65 TYPE 2 DIABETES MELLITUS WITH HYPERGLYCEMIA, WITHOUT LONG-TERM CURRENT USE OF INSULIN (HCC): ICD-10-CM

## 2024-10-09 ENCOUNTER — CLINICAL SUPPORT (OUTPATIENT)
Dept: NUTRITION | Facility: HOSPITAL | Age: 64
End: 2024-10-09
Payer: COMMERCIAL

## 2024-10-09 VITALS — BODY MASS INDEX: 33.44 KG/M2 | WEIGHT: 207.2 LBS

## 2024-10-09 DIAGNOSIS — E11.65 TYPE 2 DIABETES MELLITUS WITH HYPERGLYCEMIA, WITHOUT LONG-TERM CURRENT USE OF INSULIN (HCC): ICD-10-CM

## 2024-10-09 PROCEDURE — 97802 MEDICAL NUTRITION INDIV IN: CPT | Performed by: DIETITIAN, REGISTERED

## 2024-10-09 RX ORDER — GLYBURIDE-METFORMIN HYDROCHLORIDE 5; 500 MG/1; MG/1
1 TABLET ORAL 2 TIMES DAILY WITH MEALS
Qty: 200 TABLET | Refills: 1 | Status: SHIPPED | OUTPATIENT
Start: 2024-10-09

## 2024-10-09 NOTE — PROGRESS NOTES
" Nutrition Assessment Form    Patient Name: Chuck Cobos    YOB: 1960    Sex: Male     Assessment Date: 10/9/2024  Start Time: 1030 Stop Time: 1130 Total Minutes: 60     Data:  Present at session: self   Parent/Patient Concerns/reason for visit: \"I am here because I have diabetes\"   Medical Dx/Reason for Referral: DM   Past Medical History:   Diagnosis Date    Diabetes mellitus (HCC)     Hyperlipidemia     Hypertension        Current Outpatient Medications   Medication Sig Dispense Refill    aspirin 81 mg chewable tablet Chew 1 tablet (81 mg total) daily      cholecalciferol (VITAMIN D3) 1,000 units tablet Take 1 tablet (1,000 Units total) by mouth daily      Coenzyme Q10 (CoQ-10) 100 MG CAPS Take 100 mg by mouth in the morning  0    ezetimibe-simvastatin (VYTORIN) 10-80 MG per tablet Take 1 tablet by mouth daily at bedtime 90 tablet 3    glyBURIDE-metFORMIN (GLUCOVANCE) 5-500 MG per tablet Take 1 tablet by mouth twice daily with meals. 100 tablet 3    lisinopril (ZESTRIL) 10 mg tablet Take 1 tablet (10 mg total) by mouth daily at bedtime 30 tablet 5    Multiple Vitamin (Multi-Vitamin Daily) TABS Take 1 tablet by mouth daily      sildenafil (VIAGRA) 100 mg tablet Take 1 pill 1 hour before anticipated activity.  Do not take more than once a day 10 tablet 5     No current facility-administered medications for this visit.        Additional Meds/Supplements: Vit C- chewable    Special Learning Needs/barriers to learning/any new barriers N/a   Height: Ht Readings from Last 5 Encounters:   09/23/24 5' 6\" (1.676 m)   05/23/24 5' 6\" (1.676 m)   01/25/24 5' 6\" (1.676 m)   01/04/24 5' 5.75\" (1.67 m)   08/29/23 5' 6.5\" (1.689 m)      Weight: Wt Readings from Last 10 Encounters:   09/23/24 92.8 kg (204 lb 9.6 oz)   05/23/24 90.2 kg (198 lb 14.4 oz)   01/25/24 88.7 kg (195 lb 8 oz)   01/04/24 88.6 kg (195 lb 4.8 oz)   08/29/23 82.6 kg (182 lb 3.2 oz)   04/18/23 82.9 kg (182 lb 12.8 oz)   04/10/23 81.6 kg (180 " "lb)   11/28/16 77.3 kg (170 lb 8 oz)   09/29/15 72.8 kg (160 lb 8 oz)   03/30/15 85.9 kg (189 lb 6.4 oz)     Estimated body mass index is 33.02 kg/m² as calculated from the following:    Height as of 9/23/24: 5' 6\" (1.676 m).    Weight as of 9/23/24: 92.8 kg (204 lb 9.6 oz).   Recent Weight Change: [x]Yes     []No  Amount: 9# x 5 months not desired or intentional      Energy Needs: 1820cals 25kclas/kg -500 for 1#/wk wt loss   No Known Allergies or intolerances NKFA   Social History     Substance and Sexual Activity   Alcohol Use Yes    Alcohol/week: 2.0 standard drinks of alcohol    Types: 2 Cans of beer per week    Comment: per week    2 beers a week- light or regular   Social History     Tobacco Use   Smoking Status Never   Smokeless Tobacco Never       Who shops? 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 []Yes     [x]No  When:      Why:         Diet Hx: never skips breakfast- rarely skips lunch or dinner- gatorade zero- feels he is drinking 60-72oz daily  Breakfast: Diet: 3 cups of coffee  x10 oz- SF creamer-caffeine  8-9- baconx2-3  eggs x1  Eggs and scrapple  Oatmeal-x 1 pack- water- or sometimes oatmeal made from scratch - coconut milk- unsweetened- sometimes fruit  Cereal-special k or cheerios- multigrain- cinnamon life- 1-2% x1.5c and .5c " milk  2 egg omelet- with slices of toast-butter (whole grain)   Lunch:  drinking water or SF drink body armour lite- star bucks refresher 90cals- also drinking seltzer chery 1230- variable-  leftovers  Or bologna s/w or grilled cheese and ham- or soup sometimes  Might have some chips with s/w but only 2/10x - 10-15 on plate; or vegetables- cucumbers    Dinner: 530-6-  meat and vegetables not necessarily a carb/starch  Turkey sausage pork sausage turkey burger   Snacks: AM -  1/2 banana   PM -pop corn rarely   HS - boredom snacking    Other Notes/ Initial Assessment:  Chuck is here with his wife, because he has diabetes and would like to learn to eat better/more healthier.  He has been having some bouts of lightheadedness which caused him to seek help to begin,  He wants to know more about protein intake.  They usually do not eat pasta and have not eaten potatoes, he is thinking he needs smaller portions and more activity.  Current diet appears fairly good, but there does appear to be some excessive snacking at night, and the need for incredased activity as well.    Discussed importance of regular meals, snacks, consistent carbs intake during the day, adequate sleep, adequate fluids and activity and healthy weight management.    Provided 1800 cals sample menus, and wt loss tips from nutrition care manual and healthy snack ideas and RD name and number for home use.  No follow up desired at this time.    Updated assessment (Follow up note only):       Nutrition Diagnosis:   Altered Nutrition-Related Laboratory values  related to Kidney, liver, cardiac, endocrine, neurologic, and/or pulmonary dysfunction as evidenced by  Abnormal plasma glucose and/or HgbA1c levels       Any change or new dx since previous visit:     Nutrition Diagnosis:   Overweight/obesity  related to Excess energy intake as evidenced by  BMI more than normative standard for age and sex (obesity-grade I 30-34.9)      Medical Nutrition Therapy  Intervention:  [x]Individualized Meal Plan-Discussed the plate method of portioning foods, including half a plate fruits and vegetables or a half plate all vegetables, 1/4 of the plate a lean protein source or meat, and a 1/4 of the plate being a whole grain carb- usually 1/2-1c.  This should be followed for at least 2 meals of the day, but could also be followed for all 3 Discussed the importance of eating 3 meals daily and not skipping, and how metabolism is affected.  Also discussed adding in small snacks or 5-6 small meals daily if desired vs 3 larger ones, and appropriate options and portions.  Appropriate timing of meals, including eating within 1 hr of waking and eating meals slowly 20mins/meals, minimizing mindless/boredom or habitual eating, etc was also mentioned.Fluid intake discussed and appropriate amounts and choices, 16 oz with meals and additional 32oz during the day.  S/S dehydration also covered. []Understanding Lab Values   []Basic Pathophysiology of Disease []Food/Medication Interactions   []Food Diary [x]Exercise-150 mins of moderate activity weekly, discussed importance of variety of activity, including wt bearing activities 2-3x/wk x 10-15mins. Discussed importance of activity throughout the lifecycle and its impact on overall health/stress management, etc.   [x]Lifestyle/Behavior Modification Techniques-Discussed the importance of adequate sleep, and ideal sleeping conditions, including a cool temperature 64-68 degrees F, and a dark and quiet room. Also discussed the importance of a regular and consistent sleep routine, including minimizing blue light exposure an hour before sleeping.  Weekend habits should include staying fairly consistent with weekday sleep habits to minimize disruption during the week.  A connection was made between getting adequate and good quality sleep and the ability to handle stress the next day, make healthy food choices, and be active. []Medication, Mechanism of  "Action   []Label Reading: CHO/ Na/ Fat/ other_________ []Self Blood Glucose Monitoring   [x]Weight/BMI Goals: gain/lose/maintain-Short term goal of 2-4# x 1 month or next visit []Other -           Comprehension: []Excellent  [x]Very Good  []Good  []Fair   []Poor    Receptivity: []Excellent  [x]Very Good  []Good  []Fair   []Poor    Expected Compliance: []Excellent  [x]Very Good  []Good  []Fair   []Poor        Goals (initial)/ Progress made on previous goals/new goals:  1.1600-1800cals day   2.  30-60gms carbs per meal   3.72-80oz fluid       No follow-ups on file.  Labs:  CMP  Lab Results   Component Value Date     12/08/2014    K 4.4 05/20/2024     05/20/2024    CO2 27 05/20/2024    ANIONGAP 6 12/08/2014    BUN 17 05/20/2024    CREATININE 1.02 05/20/2024    GLUCOSE 163 (H) 12/08/2014    CALCIUM 9.6 05/20/2024    AST 18 05/20/2024    ALT 23 05/20/2024    ALKPHOS 58 05/20/2024    PROT 7.0 12/08/2014    BILITOT 0.55 12/08/2014    EGFR 82 05/20/2024       BMP  Lab Results   Component Value Date    GLUCOSE 163 (H) 12/08/2014    CALCIUM 9.6 05/20/2024     12/08/2014    K 4.4 05/20/2024    CO2 27 05/20/2024     05/20/2024    BUN 17 05/20/2024    CREATININE 1.02 05/20/2024       Lipids  Lab Results   Component Value Date    CHOL 236 12/08/2014     Lab Results   Component Value Date    HDL 46 09/18/2024    HDL 44 05/20/2024    HDL 46 08/23/2023     Lab Results   Component Value Date    LDLCALC 47 09/18/2024    LDLCALC 63 05/20/2024    LDLCALC 100 (H) 08/23/2023     Lab Results   Component Value Date    TRIG 113 09/18/2024    TRIG 130 05/20/2024    TRIG 174 (H) 08/23/2023     No results found for: \"CHOLHDL\"    Hemoglobin A1C  Lab Results   Component Value Date    HGBA1C 6.8 (H) 09/18/2024       Fasting Glucose  No results found for: \"GLUF\"    Insulin     Thyroid  No results found for: \"TSH\", \"Z2ZTGAP\", \"Z7JDJCL\", \"THYROIDAB\"    Hepatic Function Panel  Lab Results   Component Value Date    ALT 23 " "05/20/2024    AST 18 05/20/2024    ALKPHOS 58 05/20/2024    BILITOT 0.55 12/08/2014       Celiac Disease Antibody Panel  No results found for: \"ENDOMYSIAL IGA\", \"GLIADIN IGA\", \"GLIADIN IGG\", \"IGA\", \"TISSUE TRANSGLUT AB\", \"TTG IGA\"   Iron  No results found for: \"IRON\", \"TIBC\", \"FERRITIN\"         Elis Crow RD  [facility]  [address] [place] [id number]    "

## 2024-10-22 DIAGNOSIS — F52.21 ERECTILE DYSFUNCTION OF NON-ORGANIC ORIGIN: ICD-10-CM

## 2024-10-23 RX ORDER — SILDENAFIL 100 MG/1
TABLET, FILM COATED ORAL
Qty: 10 TABLET | Refills: 5 | Status: SHIPPED | OUTPATIENT
Start: 2024-10-23

## 2024-12-28 DIAGNOSIS — E11.65 TYPE 2 DIABETES MELLITUS WITH HYPERGLYCEMIA, WITHOUT LONG-TERM CURRENT USE OF INSULIN (HCC): ICD-10-CM

## 2024-12-30 RX ORDER — GLYBURIDE-METFORMIN HYDROCHLORIDE 5; 500 MG/1; MG/1
1 TABLET ORAL 2 TIMES DAILY WITH MEALS
Qty: 200 TABLET | Refills: 1 | Status: SHIPPED | OUTPATIENT
Start: 2024-12-30

## 2025-01-02 DIAGNOSIS — E11.65 TYPE 2 DIABETES MELLITUS WITH HYPERGLYCEMIA, WITHOUT LONG-TERM CURRENT USE OF INSULIN (HCC): ICD-10-CM

## 2025-01-02 DIAGNOSIS — I10 PRIMARY HYPERTENSION: ICD-10-CM

## 2025-01-03 RX ORDER — LISINOPRIL 10 MG/1
10 TABLET ORAL
Qty: 90 TABLET | Refills: 1 | Status: SHIPPED | OUTPATIENT
Start: 2025-01-03

## 2025-01-24 LAB — HBA1C MFR BLD: 6.6 % OF TOTAL HGB

## 2025-01-28 ENCOUNTER — OFFICE VISIT (OUTPATIENT)
Dept: FAMILY MEDICINE CLINIC | Facility: CLINIC | Age: 65
End: 2025-01-28
Payer: COMMERCIAL

## 2025-01-28 ENCOUNTER — TELEPHONE (OUTPATIENT)
Dept: FAMILY MEDICINE CLINIC | Facility: CLINIC | Age: 65
End: 2025-01-28

## 2025-01-28 VITALS
WEIGHT: 205.5 LBS | DIASTOLIC BLOOD PRESSURE: 80 MMHG | BODY MASS INDEX: 33.03 KG/M2 | HEART RATE: 77 BPM | TEMPERATURE: 97.8 F | SYSTOLIC BLOOD PRESSURE: 126 MMHG | RESPIRATION RATE: 15 BRPM | HEIGHT: 66 IN | OXYGEN SATURATION: 98 %

## 2025-01-28 DIAGNOSIS — Z00.00 ANNUAL PHYSICAL EXAM: ICD-10-CM

## 2025-01-28 DIAGNOSIS — E55.9 VITAMIN D DEFICIENCY: ICD-10-CM

## 2025-01-28 DIAGNOSIS — I45.10 RIGHT BUNDLE BRANCH BLOCK: Primary | ICD-10-CM

## 2025-01-28 DIAGNOSIS — E78.2 MIXED HYPERLIPIDEMIA: ICD-10-CM

## 2025-01-28 DIAGNOSIS — Z12.5 SCREENING FOR PROSTATE CANCER: ICD-10-CM

## 2025-01-28 DIAGNOSIS — E11.65 TYPE 2 DIABETES MELLITUS WITH HYPERGLYCEMIA, WITHOUT LONG-TERM CURRENT USE OF INSULIN (HCC): Primary | ICD-10-CM

## 2025-01-28 DIAGNOSIS — I10 PRIMARY HYPERTENSION: Primary | ICD-10-CM

## 2025-01-28 DIAGNOSIS — I10 PRIMARY HYPERTENSION: ICD-10-CM

## 2025-01-28 PROCEDURE — 99396 PREV VISIT EST AGE 40-64: CPT | Performed by: FAMILY MEDICINE

## 2025-01-28 PROCEDURE — 93000 ELECTROCARDIOGRAM COMPLETE: CPT | Performed by: FAMILY MEDICINE

## 2025-01-28 PROCEDURE — 99214 OFFICE O/P EST MOD 30 MIN: CPT | Performed by: FAMILY MEDICINE

## 2025-01-28 RX ORDER — CHOLECALCIFEROL (VITAMIN D3) 25 MCG
1000 TABLET ORAL DAILY
Qty: 100 TABLET | Refills: 3 | Status: SHIPPED | OUTPATIENT
Start: 2025-01-28

## 2025-01-28 RX ORDER — MULTIVITAMIN
1 TABLET ORAL DAILY
Start: 2025-01-28

## 2025-01-28 NOTE — PROGRESS NOTES
SUBJECTIVE:--------------------------------------------------------------------------------------------------  Patient is here for a well exam         Chuck Cobos is a 64 y.o.  male and is here for routine health maintenance.     Health Maintenance   Topic Date Due    PT PLAN OF CARE  Never done    Annual Physical  01/25/2025    Hepatitis C Screening  02/19/2025 (Originally 1960)    HIV Screening  01/28/2027 (Originally 2/22/1975)    Kidney Health Evaluation: GFR  05/20/2025    Kidney Health Evaluation: Albumin/Creatinine Ratio  05/20/2025    HEMOGLOBIN A1C  07/23/2025    Depression Screening  09/23/2025    Diabetic Foot Exam  01/28/2026    DM Eye Exam  02/27/2026    Colorectal Cancer Screening  04/09/2026    DTaP,Tdap,and Td Vaccines (3 - Td or Tdap) 02/14/2033    RSV Vaccine for Pregnant Patients and Patients Age 60+ Years  Completed    Zoster Vaccine  Completed    Pneumococcal Vaccine: Pediatrics (0 to 5 Years) and At-Risk Patients (6 to 64 Years)  Completed    Influenza Vaccine  Completed    COVID-19 Vaccine  Completed    Meningococcal B Vaccine  Aged Out    RSV Vaccine age 0-20 Months  Aged Out    HIB Vaccine  Aged Out    IPV Vaccine  Aged Out    Hepatitis A Vaccine  Aged Out    Meningococcal ACWY Vaccine  Aged Out    HPV Vaccine  Aged Out     Immunization History   Administered Date(s) Administered    COVID-19 MODERNA VACC 0.5 ML IM 11/03/2021, 04/11/2022, 09/26/2022    COVID-19 Moderna Vac BIVALENT 12 Yr+ IM 0.5 ML 09/26/2022    COVID-19 PFIZER VACCINE 0.3 ML IM 04/01/2021, 04/22/2021    COVID-19 Pfizer Vac BIVALENT Lucia-sucrose 12 Yr+ IM 10/29/2023    COVID-19 Pfizer mRNA vacc PF lucia-sucrose 12 yr and older (Comirnaty) 10/29/2023, 09/23/2024    INFLUENZA 09/29/2015, 11/28/2016, 10/03/2020, 09/26/2022, 09/26/2022, 09/25/2023    Influenza Quadrivalent, 6-35 Months IM 09/29/2015, 11/28/2016    Influenza Recombinant Preservative Free Im 09/23/2024    Pneumococcal Conjugate Vaccine 20-valent (Pcv20),  "Polysace 01/19/2023    Pneumococcal Polysaccharide PPV23 03/30/2015    Respiratory Syncytial Virus Vaccine (Recombinant) 05/23/2024    Tdap 12/31/2012, 02/14/2023    Zoster Vaccine Recombinant 01/19/2023, 04/18/2023       Diet and Physical Activity  Diet: well balanced diet and low carbohydrate diet  Weight concerns: Patient has class 1 obesity (BMI 30-34.9)  Exercise: frequently       General Health  Hearing:  Normal reported by patient  Vision:  Sees Ophthalmology/Optometry yearly  Dental:  Sees his dentist every 6 months    Smoker no       ASSESSMENT/PLAN:---------------------------------------------------------------------------------------    Patient's physical is up-to-date  Immunizations are up-to-date  Cancer screenings are up-to-date      Patient instructed on exercise  Patient instructed on healthy choices for diet      NEXT PHYSICAL 1 YEAR        The following portions of the patient's history were reviewed and updated as appropriate: allergies, current medications, past family history, past medical history, past social history, past surgical history and problem list.      OBJECTIVE:--------------------------------------------------------------------------------------------------  /80   Pulse 77   Temp 97.8 °F (36.6 °C)   Resp 15   Ht 5' 6\" (1.676 m)   Wt 93.2 kg (205 lb 8 oz)   SpO2 98%   BMI 33.17 kg/m²   Wt Readings from Last 3 Encounters:   01/28/25 93.2 kg (205 lb 8 oz)   10/09/24 94 kg (207 lb 3.2 oz)   09/23/24 92.8 kg (204 lb 9.6 oz)     BP Readings from Last 3 Encounters:   01/28/25 126/80   09/23/24 120/80   05/23/24 124/78     Pulse Readings from Last 3 Encounters:   01/28/25 77   09/23/24 70   05/23/24 75     Body mass index is 33.17 kg/m².  Health Maintenance   Topic Date Due    PT PLAN OF CARE  Never done    Annual Physical  01/25/2025    Hepatitis C Screening  02/19/2025 (Originally 1960)    HIV Screening  01/28/2027 (Originally 2/22/1975)    Kidney Health Evaluation: GFR  " 05/20/2025    Kidney Health Evaluation: Albumin/Creatinine Ratio  05/20/2025    HEMOGLOBIN A1C  07/23/2025    Depression Screening  09/23/2025    Diabetic Foot Exam  01/28/2026    DM Eye Exam  02/27/2026    Colorectal Cancer Screening  04/09/2026    DTaP,Tdap,and Td Vaccines (3 - Td or Tdap) 02/14/2033    RSV Vaccine for Pregnant Patients and Patients Age 60+ Years  Completed    Zoster Vaccine  Completed    Pneumococcal Vaccine: Pediatrics (0 to 5 Years) and At-Risk Patients (6 to 64 Years)  Completed    Influenza Vaccine  Completed    COVID-19 Vaccine  Completed    Meningococcal B Vaccine  Aged Out    RSV Vaccine age 0-20 Months  Aged Out    HIB Vaccine  Aged Out    IPV Vaccine  Aged Out    Hepatitis A Vaccine  Aged Out    Meningococcal ACWY Vaccine  Aged Out    HPV Vaccine  Aged Out         ROS:   12 point review of systems negative            PHYSICAL EXAM:    Gen.  No acute distress well-appearing well-nourished appears stated age    Mental status  Good judgment and insight oriented to time person and place, recent and remote memory intact mood and affect normal cooperative and patient is reasonable    HEENT  PERRLA 3 mm, EOMI without nystagmus, TMs clear, turbinates open pink no exudate, pharynx benign, tongue midline    Neck   supple no masses trachea midline positive click normal carotid upstrokes with no bruits    Cor  Regular rhythm without ectopy or murmur, no S3-S4, normal palpation that is no heave lift or thrill    Vascular  No edema, good pedal pulses    Lungs  CTA bilaterally in no respiratory distress no wheezes rhonchi or rales, normal to palpation no tactile fremitus    Abdomen  Soft, no palpable masses, no hepatosplenomegaly, normal bowel sounds, nontender    Lymphatics  No palpable nodes in the neck, supraclavicular area, axilla, or groin     Musculoskeletal  No clubbing cyanosis or edema muscle tone normal    Skin  no rashes or abnormal appearing lesions    Neuro  Normal ambulation, cranial  nerves 2-12 grossly intact, higher functioning with reasoning intact.

## 2025-01-28 NOTE — PROGRESS NOTES
ASSESSMENT/PLAN:    Type 2 diabetes  A1c improved 6.6 from 6.8 despite the holidays  Weight neutral  Continue glimepiride metformin twice a day as well as exercise and running  Patient ran 40 miles in the last month  Recheck in 3 months    Hyperlipidemia  Continue simvastatin and ezetimibe 80/10  Check cholesterol values before next visit    Hypertension  Blood pressure excellent 128/80  Continue lisinopril as well as exercise and diet  EKG today     Vitamin D deficiency  Continue vitamin D daily   Check vitamin D level     Erectile dysfunction  Sildenafil as needed         Recheck in 3 and half months  IPPE or welcome to Medicare at that time  Fasting blood work including PSA A1c microalbumin prior  Recheck sooner if needed          Health Maintenance   Topic Date Due    PT PLAN OF CARE  Never done    Annual Physical  01/25/2025    Hepatitis C Screening  02/19/2025 (Originally 1960)    HIV Screening  01/28/2027 (Originally 2/22/1975)    Kidney Health Evaluation: GFR  05/20/2025    Kidney Health Evaluation: Albumin/Creatinine Ratio  05/20/2025    HEMOGLOBIN A1C  07/23/2025    Depression Screening  09/23/2025    Diabetic Foot Exam  01/28/2026    DM Eye Exam  02/27/2026    Colorectal Cancer Screening  04/09/2026    DTaP,Tdap,and Td Vaccines (3 - Td or Tdap) 02/14/2033    RSV Vaccine for Pregnant Patients and Patients Age 60+ Years  Completed    Zoster Vaccine  Completed    Pneumococcal Vaccine: Pediatrics (0 to 5 Years) and At-Risk Patients (6 to 64 Years)  Completed    Influenza Vaccine  Completed    COVID-19 Vaccine  Completed    Meningococcal B Vaccine  Aged Out    RSV Vaccine age 0-20 Months  Aged Out    HIB Vaccine  Aged Out    IPV Vaccine  Aged Out    Hepatitis A Vaccine  Aged Out    Meningococcal ACWY Vaccine  Aged Out    HPV Vaccine  Aged Out         Problem List as of 1/28/2025 Reviewed: 9/23/2024 11:30 AM by Ame Jones DO      Erectile dysfunction of non-organic origin    Mixed hyperlipidemia     Primary hypertension    Type 2 diabetes mellitus with hyperglycemia, without long-term current use of insulin (HCC)    Vitamin D deficiency         Subjective:   Chief Complaint   Patient presents with    Physical Exam     Patient is here for his diabetes recheck and blood pressure check    Also here for his full physical        patient ID: Chuck Cobos is a 64 y.o. male.    Patient's past medical history, surgical history, family history, social history, and Tobacco history reviewed with patient.     MED LIST WAS REVIEWED AND UPDATED    ROS  As per HPI  Rest of 12 point review of systems negative     Objective:      VITALS:  Wt Readings from Last 3 Encounters:   01/28/25 93.2 kg (205 lb 8 oz)   10/09/24 94 kg (207 lb 3.2 oz)   09/23/24 92.8 kg (204 lb 9.6 oz)     BP Readings from Last 3 Encounters:   01/28/25 126/80   09/23/24 120/80   05/23/24 124/78     Pulse Readings from Last 3 Encounters:   01/28/25 77   09/23/24 70   05/23/24 75     Body mass index is 33.17 kg/m².    Laboratory Results:   All pertinent labs and studies were reviewed with patient during this office visit with highlights of the results contained in this note in the ASSESSMENT AND PLAN section       Physical Exam    Gen.  No acute distress well-appearing well-nourished appears stated age    Mental status  Good judgment and insight oriented to time person and place, recent and remote memory intact mood and affect normal cooperative and patient is reasonable    HEENT  PERRLA 3 mm, EOMI without nystagmus, normocephalic atraumatic without facial weakness    Neck   supple no masses trachea midline positive click normal carotid upstrokes with no bruits    Cor  Regular rhythm without ectopy or murmur, no S3-S4, normal palpation that is no heave lift or thrill    Vascular  No edema, good pedal pulses    Lungs  CTA bilaterally in no respiratory distress no wheezes rhonchi or rales, normal to palpation no tactile fremitus    Abdomen  Soft, no palpable  masses, no hepatosplenomegaly, normal bowel sounds, nontender    Lymphatics  No palpable nodes in the neck, supraclavicular area, axilla, or groin    Musculoskeletal  No clubbing cyanosis or edema muscle tone normal    Skin  no rashes or abnormal appearing lesions    Neuro  Normal ambulation, cranial nerves 2-12 grossly intact, higher functioning with reasoning intact.

## 2025-01-28 NOTE — TELEPHONE ENCOUNTER
Patient had EKG done today  Compared to last one from 2012 there are some changes  Because of this I would like to do an echo on patient to see what his heart wall motion is as well as how his valves are functioning.    We will call with the results

## 2025-01-28 NOTE — PATIENT INSTRUCTIONS
Keep on running this is good  Keep the medicines the same    Recheck blood work in 3 months and we will see you in 3-1/2 months    Blood work is fasting with a urine  You sooner if needed

## 2025-01-30 ENCOUNTER — HOSPITAL ENCOUNTER (OUTPATIENT)
Dept: NON INVASIVE DIAGNOSTICS | Facility: CLINIC | Age: 65
Discharge: HOME | End: 2025-01-30
Payer: COMMERCIAL

## 2025-01-30 VITALS
SYSTOLIC BLOOD PRESSURE: 126 MMHG | HEIGHT: 66 IN | HEART RATE: 77 BPM | DIASTOLIC BLOOD PRESSURE: 80 MMHG | BODY MASS INDEX: 32.95 KG/M2 | WEIGHT: 205 LBS

## 2025-01-30 DIAGNOSIS — I45.10 RIGHT BUNDLE BRANCH BLOCK: ICD-10-CM

## 2025-01-30 LAB
AORTIC ROOT: 2.9 CM
BSA FOR ECHO PROCEDURE: 2.02 M2
E WAVE DECELERATION TIME: 192 MS
E/A RATIO: 1.1
FRACTIONAL SHORTENING: 30 (ref 28–44)
INTERVENTRICULAR SEPTUM IN DIASTOLE (PARASTERNAL SHORT AXIS VIEW): 0.8 CM
INTERVENTRICULAR SEPTUM: 0.8 CM (ref 0.6–1.1)
LAAS-AP2: 17.5 CM2
LAAS-AP4: 16.4 CM2
LEFT ATRIUM SIZE: 4.3 CM
LEFT ATRIUM VOLUME (MOD BIPLANE): 49 ML
LEFT ATRIUM VOLUME INDEX (MOD BIPLANE): 24.3 ML/M2
LEFT INTERNAL DIMENSION IN SYSTOLE: 3.5 CM (ref 2.1–4)
LEFT VENTRICULAR INTERNAL DIMENSION IN DIASTOLE: 5 CM (ref 3.5–6)
LEFT VENTRICULAR POSTERIOR WALL IN END DIASTOLE: 1.2 CM
LEFT VENTRICULAR STROKE VOLUME: 69 ML
LV EF US.2D.A4C+ESTIMATED: 58 %
LVSV (TEICH): 69 ML
MV E'TISSUE VEL-SEP: 9 CM/S
MV PEAK A VEL: 0.82 M/S
MV PEAK E VEL: 90 CM/S
MV STENOSIS PRESSURE HALF TIME: 56 MS
MV VALVE AREA P 1/2 METHOD: 3.93
RIGHT ATRIUM AREA SYSTOLE A4C: 18.2 CM2
RIGHT VENTRICLE ID DIMENSION: 4.4 CM
SL CV LEFT ATRIUM LENGTH A2C: 5 CM
SL CV LV EF: 60
SL CV PED ECHO LEFT VENTRICLE DIASTOLIC VOLUME (MOD BIPLANE) 2D: 120 ML
SL CV PED ECHO LEFT VENTRICLE SYSTOLIC VOLUME (MOD BIPLANE) 2D: 51 ML
TRICUSPID ANNULAR PLANE SYSTOLIC EXCURSION: 2.1 CM

## 2025-01-30 PROCEDURE — 93306 TTE W/DOPPLER COMPLETE: CPT | Performed by: INTERNAL MEDICINE

## 2025-01-30 PROCEDURE — 93306 TTE W/DOPPLER COMPLETE: CPT

## 2025-02-01 ENCOUNTER — RESULTS FOLLOW-UP (OUTPATIENT)
Dept: FAMILY MEDICINE CLINIC | Facility: CLINIC | Age: 65
End: 2025-02-01

## 2025-02-11 DIAGNOSIS — F52.21 ERECTILE DYSFUNCTION OF NON-ORGANIC ORIGIN: ICD-10-CM

## 2025-02-11 RX ORDER — SILDENAFIL 100 MG/1
TABLET, FILM COATED ORAL
Qty: 10 TABLET | Refills: 0 | Status: SHIPPED | OUTPATIENT
Start: 2025-02-11

## 2025-02-26 LAB
LEFT EYE DIABETIC RETINOPATHY: NORMAL
RIGHT EYE DIABETIC RETINOPATHY: NORMAL

## 2025-03-11 DIAGNOSIS — F52.21 ERECTILE DYSFUNCTION OF NON-ORGANIC ORIGIN: ICD-10-CM

## 2025-03-11 RX ORDER — SILDENAFIL 100 MG/1
TABLET, FILM COATED ORAL
Qty: 10 TABLET | Refills: 0 | Status: SHIPPED | OUTPATIENT
Start: 2025-03-11

## 2025-04-07 DIAGNOSIS — E78.2 MIXED HYPERLIPIDEMIA: ICD-10-CM

## 2025-04-08 DIAGNOSIS — F52.21 ERECTILE DYSFUNCTION OF NON-ORGANIC ORIGIN: ICD-10-CM

## 2025-04-08 DIAGNOSIS — E78.2 MIXED HYPERLIPIDEMIA: ICD-10-CM

## 2025-04-08 RX ORDER — EZETIMIBE AND SIMVASTATIN 10; 80 MG/1; MG/1
1 TABLET ORAL
Qty: 90 TABLET | Refills: 1 | Status: SHIPPED | OUTPATIENT
Start: 2025-04-08

## 2025-04-09 RX ORDER — SILDENAFIL 100 MG/1
TABLET, FILM COATED ORAL
Qty: 10 TABLET | Refills: 0 | Status: SHIPPED | OUTPATIENT
Start: 2025-04-09

## 2025-04-09 RX ORDER — EZETIMIBE AND SIMVASTATIN 10; 80 MG/1; MG/1
1 TABLET ORAL
Qty: 90 TABLET | Refills: 0 | OUTPATIENT
Start: 2025-04-09

## 2025-05-06 ENCOUNTER — APPOINTMENT (OUTPATIENT)
Dept: LAB | Facility: CLINIC | Age: 65
End: 2025-05-06
Payer: MEDICARE

## 2025-05-06 DIAGNOSIS — E11.65 TYPE 2 DIABETES MELLITUS WITH HYPERGLYCEMIA, WITHOUT LONG-TERM CURRENT USE OF INSULIN (HCC): ICD-10-CM

## 2025-05-06 DIAGNOSIS — E78.2 MIXED HYPERLIPIDEMIA: ICD-10-CM

## 2025-05-06 DIAGNOSIS — Z12.5 SCREENING FOR PROSTATE CANCER: ICD-10-CM

## 2025-05-06 DIAGNOSIS — E55.9 VITAMIN D DEFICIENCY: ICD-10-CM

## 2025-05-06 DIAGNOSIS — I10 PRIMARY HYPERTENSION: ICD-10-CM

## 2025-05-06 LAB
25(OH)D3 SERPL-MCNC: 39.3 NG/ML (ref 30–100)
ALBUMIN SERPL BCG-MCNC: 4.1 G/DL (ref 3.5–5)
ALP SERPL-CCNC: 46 U/L (ref 34–104)
ALT SERPL W P-5'-P-CCNC: 23 U/L (ref 7–52)
ANION GAP SERPL CALCULATED.3IONS-SCNC: 9 MMOL/L (ref 4–13)
AST SERPL W P-5'-P-CCNC: 19 U/L (ref 13–39)
BASOPHILS # BLD AUTO: 0.06 THOUSANDS/ÂΜL (ref 0–0.1)
BASOPHILS NFR BLD AUTO: 1 % (ref 0–1)
BILIRUB SERPL-MCNC: 0.53 MG/DL (ref 0.2–1)
BILIRUB UR QL STRIP: NEGATIVE
BUN SERPL-MCNC: 18 MG/DL (ref 5–25)
CALCIUM SERPL-MCNC: 9.2 MG/DL (ref 8.4–10.2)
CHLORIDE SERPL-SCNC: 104 MMOL/L (ref 96–108)
CHOLEST SERPL-MCNC: 117 MG/DL (ref ?–200)
CLARITY UR: CLEAR
CO2 SERPL-SCNC: 27 MMOL/L (ref 21–32)
COLOR UR: NORMAL
CREAT SERPL-MCNC: 0.93 MG/DL (ref 0.6–1.3)
CREAT UR-MCNC: 147.8 MG/DL
EOSINOPHIL # BLD AUTO: 0.2 THOUSAND/ÂΜL (ref 0–0.61)
EOSINOPHIL NFR BLD AUTO: 3 % (ref 0–6)
ERYTHROCYTE [DISTWIDTH] IN BLOOD BY AUTOMATED COUNT: 12.5 % (ref 11.6–15.1)
EST. AVERAGE GLUCOSE BLD GHB EST-MCNC: 160 MG/DL
GFR SERPL CREATININE-BSD FRML MDRD: 85 ML/MIN/1.73SQ M
GLUCOSE P FAST SERPL-MCNC: 77 MG/DL (ref 65–99)
GLUCOSE UR STRIP-MCNC: NEGATIVE MG/DL
HBA1C MFR BLD: 7.2 %
HCT VFR BLD AUTO: 47.6 % (ref 36.5–49.3)
HDLC SERPL-MCNC: 44 MG/DL
HGB BLD-MCNC: 15.3 G/DL (ref 12–17)
HGB UR QL STRIP.AUTO: NEGATIVE
IMM GRANULOCYTES # BLD AUTO: 0.01 THOUSAND/UL (ref 0–0.2)
IMM GRANULOCYTES NFR BLD AUTO: 0 % (ref 0–2)
KETONES UR STRIP-MCNC: NEGATIVE MG/DL
LDLC SERPL CALC-MCNC: 40 MG/DL (ref 0–100)
LEUKOCYTE ESTERASE UR QL STRIP: NEGATIVE
LYMPHOCYTES # BLD AUTO: 1.91 THOUSANDS/ÂΜL (ref 0.6–4.47)
LYMPHOCYTES NFR BLD AUTO: 30 % (ref 14–44)
MCH RBC QN AUTO: 29.8 PG (ref 26.8–34.3)
MCHC RBC AUTO-ENTMCNC: 32.1 G/DL (ref 31.4–37.4)
MCV RBC AUTO: 93 FL (ref 82–98)
MICROALBUMIN UR-MCNC: 32 MG/L
MICROALBUMIN/CREAT 24H UR: 22 MG/G CREATININE (ref 0–30)
MONOCYTES # BLD AUTO: 0.52 THOUSAND/ÂΜL (ref 0.17–1.22)
MONOCYTES NFR BLD AUTO: 8 % (ref 4–12)
NEUTROPHILS # BLD AUTO: 3.68 THOUSANDS/ÂΜL (ref 1.85–7.62)
NEUTS SEG NFR BLD AUTO: 58 % (ref 43–75)
NITRITE UR QL STRIP: NEGATIVE
NONHDLC SERPL-MCNC: 73 MG/DL
NRBC BLD AUTO-RTO: 0 /100 WBCS
PH UR STRIP.AUTO: 5.5 [PH]
PLATELET # BLD AUTO: 216 THOUSANDS/UL (ref 149–390)
PMV BLD AUTO: 11.2 FL (ref 8.9–12.7)
POTASSIUM SERPL-SCNC: 3.9 MMOL/L (ref 3.5–5.3)
PROT SERPL-MCNC: 6.5 G/DL (ref 6.4–8.4)
PROT UR STRIP-MCNC: NEGATIVE MG/DL
PSA SERPL-MCNC: 1.49 NG/ML (ref 0–4)
RBC # BLD AUTO: 5.14 MILLION/UL (ref 3.88–5.62)
SODIUM SERPL-SCNC: 140 MMOL/L (ref 135–147)
SP GR UR STRIP.AUTO: 1.02 (ref 1–1.03)
TRIGL SERPL-MCNC: 164 MG/DL (ref ?–150)
TSH SERPL DL<=0.05 MIU/L-ACNC: 2.43 UIU/ML (ref 0.45–4.5)
UROBILINOGEN UR STRIP-ACNC: <2 MG/DL
WBC # BLD AUTO: 6.38 THOUSAND/UL (ref 4.31–10.16)

## 2025-05-06 PROCEDURE — 36415 COLL VENOUS BLD VENIPUNCTURE: CPT

## 2025-05-06 PROCEDURE — 82043 UR ALBUMIN QUANTITATIVE: CPT

## 2025-05-06 PROCEDURE — 82570 ASSAY OF URINE CREATININE: CPT

## 2025-05-06 PROCEDURE — 82306 VITAMIN D 25 HYDROXY: CPT

## 2025-05-06 PROCEDURE — 80061 LIPID PANEL: CPT

## 2025-05-06 PROCEDURE — 81003 URINALYSIS AUTO W/O SCOPE: CPT

## 2025-05-06 PROCEDURE — 84443 ASSAY THYROID STIM HORMONE: CPT

## 2025-05-06 PROCEDURE — 80053 COMPREHEN METABOLIC PANEL: CPT

## 2025-05-06 PROCEDURE — 83036 HEMOGLOBIN GLYCOSYLATED A1C: CPT

## 2025-05-06 PROCEDURE — 85025 COMPLETE CBC W/AUTO DIFF WBC: CPT

## 2025-05-06 PROCEDURE — G0103 PSA SCREENING: HCPCS

## 2025-05-12 ENCOUNTER — OFFICE VISIT (OUTPATIENT)
Dept: FAMILY MEDICINE CLINIC | Facility: CLINIC | Age: 65
End: 2025-05-12
Payer: MEDICARE

## 2025-05-12 VITALS
SYSTOLIC BLOOD PRESSURE: 120 MMHG | TEMPERATURE: 98.2 F | WEIGHT: 210.4 LBS | DIASTOLIC BLOOD PRESSURE: 74 MMHG | OXYGEN SATURATION: 98 % | BODY MASS INDEX: 33.82 KG/M2 | HEART RATE: 68 BPM | RESPIRATION RATE: 15 BRPM | HEIGHT: 66 IN

## 2025-05-12 DIAGNOSIS — E55.9 VITAMIN D DEFICIENCY: ICD-10-CM

## 2025-05-12 DIAGNOSIS — E11.65 TYPE 2 DIABETES MELLITUS WITH HYPERGLYCEMIA, WITHOUT LONG-TERM CURRENT USE OF INSULIN (HCC): Primary | ICD-10-CM

## 2025-05-12 DIAGNOSIS — Z13.6 SCREENING FOR AAA (ABDOMINAL AORTIC ANEURYSM): ICD-10-CM

## 2025-05-12 DIAGNOSIS — Z13.6 ENCOUNTER FOR ABDOMINAL AORTIC ANEURYSM SCREENING: ICD-10-CM

## 2025-05-12 DIAGNOSIS — F52.21 ERECTILE DYSFUNCTION OF NON-ORGANIC ORIGIN: ICD-10-CM

## 2025-05-12 DIAGNOSIS — I10 PRIMARY HYPERTENSION: ICD-10-CM

## 2025-05-12 DIAGNOSIS — E78.2 MIXED HYPERLIPIDEMIA: ICD-10-CM

## 2025-05-12 DIAGNOSIS — Z00.00 MEDICARE ANNUAL WELLNESS VISIT, INITIAL: ICD-10-CM

## 2025-05-12 PROCEDURE — G0438 PPPS, INITIAL VISIT: HCPCS | Performed by: FAMILY MEDICINE

## 2025-05-12 PROCEDURE — 99214 OFFICE O/P EST MOD 30 MIN: CPT | Performed by: FAMILY MEDICINE

## 2025-05-12 PROCEDURE — G2211 COMPLEX E/M VISIT ADD ON: HCPCS | Performed by: FAMILY MEDICINE

## 2025-05-12 RX ORDER — GLYBURIDE-METFORMIN HYDROCHLORIDE 5; 500 MG/1; MG/1
1 TABLET ORAL 2 TIMES DAILY WITH MEALS
Qty: 200 TABLET | Refills: 1 | Status: SHIPPED | OUTPATIENT
Start: 2025-05-12

## 2025-05-12 RX ORDER — SILDENAFIL 100 MG/1
TABLET, FILM COATED ORAL
Qty: 10 TABLET | Refills: 6 | Status: SHIPPED | OUTPATIENT
Start: 2025-05-12

## 2025-05-12 RX ORDER — LISINOPRIL 10 MG/1
10 TABLET ORAL
Qty: 90 TABLET | Refills: 1 | Status: SHIPPED | OUTPATIENT
Start: 2025-05-12

## 2025-05-12 NOTE — ASSESSMENT & PLAN NOTE
A1c is 7.2 from 6.6 because of dietary indiscretions  Patient will continue the same dose of glyburide and metformin but will increase his exercise and decrease his simple carb intake  Recheck in 3 months  Lab Results   Component Value Date    HGBA1C 7.2 (H) 05/06/2025       Orders:    glyBURIDE-metFORMIN (GLUCOVANCE) 5-500 MG per tablet; Take 1 tablet by mouth 2 (two) times a day with meals    lisinopril (ZESTRIL) 10 mg tablet; Take 1 tablet (10 mg total) by mouth daily at bedtime    Hemoglobin A1C; Future

## 2025-05-12 NOTE — PROGRESS NOTES
Name: Chuck Cobos      : 1960      MRN: [id number]  Encounter Provider: Ame Jones DO  Encounter Date: 2025   Encounter department: [facility]  :  Please clean up your diet and increase your exercise  Call and schedule your AAA ultrasound your belly to rule out an aneurysm in your belly  Keep your medicine the same we will check an A1c in 3 months    Assessment & Plan  Mixed hyperlipidemia  Continue ezetimibe and simvastatin  Cholesterol values are excellent at 117 with a LDL of 40 but triglycerides went up a bit because of diet       Primary hypertension  Blood pressure 120/74 which is excellent  Continue lisinopril and start doing aerobic exercise  Orders:    lisinopril (ZESTRIL) 10 mg tablet; Take 1 tablet (10 mg total) by mouth daily at bedtime    Type 2 diabetes mellitus with hyperglycemia, without long-term current use of insulin (HCC)  A1c is 7.2 from 6.6 because of dietary indiscretions  Patient will continue the same dose of glyburide and metformin but will increase his exercise and decrease his simple carb intake  Recheck in 3 months  Lab Results   Component Value Date    HGBA1C 7.2 (H) 2025       Orders:    glyBURIDE-metFORMIN (GLUCOVANCE) 5-500 MG per tablet; Take 1 tablet by mouth 2 (two) times a day with meals    lisinopril (ZESTRIL) 10 mg tablet; Take 1 tablet (10 mg total) by mouth daily at bedtime    Hemoglobin A1C; Future    Vitamin D deficiency  Vitamin D level is 39  Continue vitamin D and multivitamin       Encounter for abdominal aortic aneurysm screening  We will call with results or send through [employer] the AAA screen  Orders:     abdominal aorta screening aaa; Future    Medicare annual wellness visit, initial         Screening for AAA (abdominal aortic aneurysm)    Orders:    US abdominal aorta screening aaa; Future    Erectile dysfunction of non-organic origin    Orders:    sildenafil (VIAGRA) 100 mg tablet; Take 1 pill 1  hour before anticipated activity.  Do not take more than once a day      Depression Screening and Follow-up Plan: Patient was screened for depression during today's encounter. They screened negative with a PHQ-2 score of 0.        Preventive health issues were discussed with patient, and age appropriate screening tests were ordered as noted in patient's After Visit Summary. Personalized health advice and appropriate referrals for health education or preventive services given if needed, as noted in patient's After Visit Summary.    History of Present Illness     Patient is here for his diabetes recheck  He got his fasting blood work done and as well as the rest of his screening labs  He is also here for his 1st welcome to Medicare physical       Patient Care Team:  Ame Jones DO as PCP - General (Family Medicine)    Review of Systems  Negative x 12  Medical History Reviewed by provider this encounter:  Tobacco  Allergies  Meds  Problems  Med Hx  Surg Hx  Fam Hx       Annual Wellness Visit Questionnaire   Chuck is here for his Welcome to Medicare visit.     Health Risk Assessment:   Patient rates overall health as good. Patient feels that their physical health rating is same. Patient is satisfied with their life. Eyesight was rated as same. Hearing was rated as same. Patient feels that their emotional and mental health rating is slightly better. Patients states they are sometimes angry. Patient states they are never, rarely unusually tired/fatigued. Pain experienced in the last 7 days has been none. Patient states that he has experienced weight loss or gain in last 6 months.     Depression Screening:   PHQ-2 Score: 0      Fall Risk Screening:   In the past year, patient has experienced: no history of falling in past year      Home Safety:  Patient does not have trouble with stairs inside or outside of their home. Patient has working smoke alarms and has working carbon monoxide detector. Home safety hazards  include: none.     Nutrition:   Current diet is Regular.     Medications:   Patient is not currently taking any over-the-counter supplements. Patient is able to manage medications.     Activities of Daily Living (ADLs)/Instrumental Activities of Daily Living (IADLs):   Walk and transfer into and out of bed and chair?: Yes  Dress and groom yourself?: Yes    Bathe or shower yourself?: Yes    Feed yourself? Yes  Do your laundry/housekeeping?: Yes  Manage your money, pay your bills and track your expenses?: Yes  Make your own meals?: Yes    Do your own shopping?: Yes    Previous Hospitalizations:   Any hospitalizations or ED visits within the last 12 months?: No      Advance Care Planning:     Durable POA for healthcare: Yes    Advanced directive: Yes    Advanced directive counseling given: Yes      Cognitive Screening:   Provider or family/friend/caregiver concerned regarding cognition?: No    Preventive Screenings      Cardiovascular Screening:    General: Screening Not Indicated and History Lipid Disorder      Diabetes Screening:     General: Screening Not Indicated and History Diabetes      Colorectal Cancer Screening:     General: Screening Current      Prostate Cancer Screening:    General: Screening Current      Osteoporosis Screening:    General: Screening Not Indicated      Abdominal Aortic Aneurysm (AAA) Screening:    Risk factors include: age between 65-74 yo      Due for: Screening AAA Ultrasound      Lung Cancer Screening:     General: Screening Not Indicated      Hepatitis C Screening:    General: Screening Current    Screening, Brief Intervention, and Referral to Treatment (SBIRT)     Screening  Typical number of drinks in a day: 0  Typical number of drinks in a week: 2  Interpretation: Low risk drinking behavior.    AUDIT-C Screenin) How often did you have a drink containing alcohol in the past year? 2 to 4 times a month  2) How many drinks did you have on a typical day when you were drinking in the  "past year? 1 to 2  3) How often did you have 6 or more drinks on one occasion in the past year? never    AUDIT-C Score: 2  Interpretation: Score 0-3 (male): Negative screen for alcohol misuse    Single Item Drug Screening:  How often have you used an illegal drug (including marijuana) or a prescription medication for non-medical reasons in the past year? never    Single Item Drug Screen Score: 0  Interpretation: Negative screen for possible drug use disorder    Brief Intervention  Alcohol & drug use screenings were reviewed. No concerns regarding substance use disorder identified.     Other Counseling Topics:   Regular weightbearing exercise.     Social Drivers of Health     Food Insecurity: No Food Insecurity (5/12/2025)    Hunger Vital Sign     Worried About Running Out of Food in the Last Year: Never true     Ran Out of Food in the Last Year: Never true   Transportation Needs: No Transportation Needs (5/12/2025)    PRAPARE - Transportation     Lack of Transportation (Medical): No     Lack of Transportation (Non-Medical): No   Housing Stability: Low Risk  (5/12/2025)    Housing Stability Vital Sign     Unable to Pay for Housing in the Last Year: No     Number of Times Moved in the Last Year: 1     Homeless in the Last Year: No   Utilities: Not At Risk (5/12/2025)    [facility] Utilities     Threatened with loss of utilities: No     Vision Screening    Right eye Left eye Both eyes   Without correction      With correction 20/20 20/30 20/25       Objective   /74   Pulse 68   Temp 98.2 °F (36.8 °C)   Resp 15   Ht 5' 6\" (1.676 m)   Wt 95.4 kg (210 lb 6.4 oz)   SpO2 98%   BMI 33.96 kg/m²     Physical Exam  Constitutional  Appears healthy, Looks well, Appearance consistent with age    Mental Status  Alert, Oriented, Cooperative, Memory function normal , clean, and reasonable    Neck  No neck mass, No thyromegaly, Good carotid upstrokes bilaterally, trachea midline positive click    Respiratory  Breath sounds " normal, No rales, No rhonchi, No wheezing, normal palpation    Cardiac  Regular rhythm without ectopy or murmur no S3-S4, no heave lift or thrill to palpation    Vascular  No leg edema, No pedal edema    Muscular skeletal  No clubbing cyanosis , muscle tone normal    Skin  No appreciable rashes or abnormal appearing lesions

## 2025-05-12 NOTE — ASSESSMENT & PLAN NOTE
Orders:    sildenafil (VIAGRA) 100 mg tablet; Take 1 pill 1 hour before anticipated activity.  Do not take more than once a day

## 2025-05-12 NOTE — ASSESSMENT & PLAN NOTE
Blood pressure 120/74 which is excellent  Continue lisinopril and start doing aerobic exercise  Orders:    lisinopril (ZESTRIL) 10 mg tablet; Take 1 tablet (10 mg total) by mouth daily at bedtime

## 2025-05-12 NOTE — PATIENT INSTRUCTIONS
Please clean up your diet and increase your exercise  Call and schedule your AAA ultrasound your belly to rule out an aneurysm in your belly  Keep your medicine the same we will check an A1c in 3 months  Medicare Preventive Visit Patient Instructions  Thank you for completing your Welcome to Medicare Visit or Medicare Annual Wellness Visit today. Your next wellness visit will be due in one year (5/13/2026).  The screening/preventive services that you may require over the next 5-10 years are detailed below. Some tests may not apply to you based off risk factors and/or age. Screening tests ordered at today's visit but not completed yet may show as past due. Also, please note that scanned in results may not display below.  Preventive Screenings:  Service Recommendations Previous Testing/Comments   Colorectal Cancer Screening  Colonoscopy    Fecal Occult Blood Test (FOBT)/Fecal Immunochemical Test (FIT)  Fecal DNA/Cologuard Test  Flexible Sigmoidoscopy Age: 45-75 years old   Colonoscopy: every 10 years (May be performed more frequently if at higher risk)  OR  FOBT/FIT: every 1 year  OR  Cologuard: every 3 years  OR  Sigmoidoscopy: every 5 years  Screening may be recommended earlier than age 45 if at higher risk for colorectal cancer. Also, an individualized decision between you and your healthcare provider will decide whether screening between the ages of 76-85 would be appropriate. Colonoscopy: 04/10/2023  FOBT/FIT: Not on file  Cologuard: Not on file  Sigmoidoscopy: Not on file    Screening Current     Prostate Cancer Screening Individualized decision between patient and health care provider in men between ages of 55-69   Medicare will cover every 12 months beginning on the day after your 50th birthday PSA: 1.486 ng/mL     Screening Current     Hepatitis C Screening Once for adults born between 1945 and 1965  More frequently in patients at high risk for Hepatitis C Hep C Antibody: Not on file    Screening Current    Diabetes Screening 1-2 times per year if you're at risk for diabetes or have pre-diabetes Fasting glucose: 77 mg/dL (5/6/2025)  A1C: 7.2 % (5/6/2025)  Screening Not Indicated  History Diabetes   Cholesterol Screening Once every 5 years if you don't have a lipid disorder. May order more often based on risk factors. Lipid panel: 05/06/2025  Screening Not Indicated  History Lipid Disorder      Other Preventive Screenings Covered by Medicare:  Abdominal Aortic Aneurysm (AAA) Screening: covered once if your at risk. You're considered to be at risk if you have a family history of AAA or a male between the age of 65-75 who smoking at least 100 cigarettes in your lifetime.  Lung Cancer Screening: covers low dose CT scan once per year if you meet all of the following conditions: (1) Age 55-77; (2) No signs or symptoms of lung cancer; (3) Current smoker or have quit smoking within the last 15 years; (4) You have a tobacco smoking history of at least 20 pack years (packs per day x number of years you smoked); (5) You get a written order from a healthcare provider.  Glaucoma Screening: covered annually if you're considered high risk: (1) You have diabetes OR (2) Family history of glaucoma OR (3)  aged 50 and older OR (4)  American aged 65 and older  Osteoporosis Screening: covered every 2 years if you meet one of the following conditions: (1) Have a vertebral abnormality; (2) On glucocorticoid therapy for more than 3 months; (3) Have primary hyperparathyroidism; (4) On osteoporosis medications and need to assess response to drug therapy.  HIV Screening: covered annually if you're between the age of 15-65. Also covered annually if you are younger than 15 and older than 65 with risk factors for HIV infection. For pregnant patients, it is covered up to 3 times per pregnancy.    Immunizations:  Immunization Recommendations   Influenza Vaccine Annual influenza vaccination during flu season is recommended  for all persons aged >= 6 months who do not have contraindications   Pneumococcal Vaccine   * Pneumococcal conjugate vaccine = PCV13 (Prevnar 13), PCV15 (Vaxneuvance), PCV20 (Prevnar 20)  * Pneumococcal polysaccharide vaccine = PPSV23 (Pneumovax) Adults 19-63 yo with certain risk factors or if 65+ yo  If never received any pneumonia vaccine: recommend Prevnar 20 (PCV20)  Give PCV20 if previously received 1 dose of PCV13 or PPSV23   Hepatitis B Vaccine 3 dose series if at intermediate or high risk (ex: diabetes, end stage renal disease, liver disease)   Respiratory syncytial virus (RSV) Vaccine - COVERED BY MEDICARE PART D  * RSVPreF3 (Arexvy) CDC recommends that adults 60 years of age and older may receive a single dose of RSV vaccine using shared clinical decision-making (SCDM)   Tetanus (Td) Vaccine - COST NOT COVERED BY MEDICARE PART B Following completion of primary series, a booster dose should be given every 10 years to maintain immunity against tetanus. Td may also be given as tetanus wound prophylaxis.   Tdap Vaccine - COST NOT COVERED BY MEDICARE PART B Recommended at least once for all adults. For pregnant patients, recommended with each pregnancy.   Shingles Vaccine (Shingrix) - COST NOT COVERED BY MEDICARE PART B  2 shot series recommended in those 19 years and older who have or will have weakened immune systems or those 50 years and older     Health Maintenance Due:      Topic Date Due    HIV Screening  01/28/2027 (Originally 2/22/1975)    Hepatitis C Screening  06/12/2027 (Originally 1960)    Colorectal Cancer Screening  04/09/2026     Immunizations Due:      Topic Date Due    COVID-19 Vaccine (9 - 2024-25 season) 03/23/2025     Advance Directives   What are advance directives?  Advance directives are legal documents that state your wishes and plans for medical care. These plans are made ahead of time in case you lose your ability to make decisions for yourself. Advance directives can apply to any  medical decision, such as the treatments you want, and if you want to donate organs.   What are the types of advance directives?  There are many types of advance directives, and each state has rules about how to use them. You may choose a combination of any of the following:  Living will:  This is a written record of the treatment you want. You can also choose which treatments you do not want, which to limit, and which to stop at a certain time. This includes surgery, medicine, IV fluid, and tube feedings.   Durable power of  for healthcare (DPAHC):  This is a written record that states who you want to make healthcare choices for you when you are unable to make them for yourself. This person, called a proxy, is usually a family member or a friend. You may choose more than 1 proxy.  Do not resuscitate (DNR) order:  A DNR order is used in case your heart stops beating or you stop breathing. It is a request not to have certain forms of treatment, such as CPR. A DNR order may be included in other types of advance directives.  Medical directive:  This covers the care that you want if you are in a coma, near death, or unable to make decisions for yourself. You can list the treatments you want for each condition. Treatment may include pain medicine, surgery, blood transfusions, dialysis, IV or tube feedings, and a ventilator (breathing machine).  Values history:  This document has questions about your views, beliefs, and how you feel and think about life. This information can help others choose the care that you would choose.  Why are advance directives important?  An advance directive helps you control your care. Although spoken wishes may be used, it is better to have your wishes written down. Spoken wishes can be misunderstood, or not followed. Treatments may be given even if you do not want them. An advance directive may make it easier for your family to make difficult choices about your care.   Weight  Management   Why it is important to manage your weight:  Being overweight increases your risk of health conditions such as heart disease, high blood pressure, type 2 diabetes, and certain types of cancer. It can also increase your risk for osteoarthritis, sleep apnea, and other respiratory problems. Aim for a slow, steady weight loss. Even a small amount of weight loss can lower your risk of health problems.  How to lose weight safely:  A safe and healthy way to lose weight is to eat fewer calories and get regular exercise. You can lose up about 1 pound a week by decreasing the number of calories you eat by 500 calories each day.   Healthy meal plan for weight management:  A healthy meal plan includes a variety of foods, contains fewer calories, and helps you stay healthy. A healthy meal plan includes the following:  Eat whole-grain foods more often.  A healthy meal plan should contain fiber. Fiber is the part of grains, fruits, and vegetables that is not broken down by your body. Whole-grain foods are healthy and provide extra fiber in your diet. Some examples of whole-grain foods are whole-wheat breads and pastas, oatmeal, brown rice, and bulgur.  Eat a variety of vegetables every day.  Include dark, leafy greens such as spinach, kale, luciana greens, and mustard greens. Eat yellow and orange vegetables such as carrots, sweet potatoes, and winter squash.   Eat a variety of fruits every day.  Choose fresh or canned fruit (canned in its own juice or light syrup) instead of juice. Fruit juice has very little or no fiber.  Eat low-fat dairy foods.  Drink fat-free (skim) milk or 1% milk. Eat fat-free yogurt and low-fat cottage cheese. Try low-fat cheeses such as mozzarella and other reduced-fat cheeses.  Choose meat and other protein foods that are low in fat.  Choose beans or other legumes such as split peas or lentils. Choose fish, skinless poultry (chicken or turkey), or lean cuts of red meat (beef or pork). Before  you cook meat or poultry, cut off any visible fat.   Use less fat and oil.  Try baking foods instead of frying them. Add less fat, such as margarine, sour cream, regular salad dressing and mayonnaise to foods. Eat fewer high-fat foods. Some examples of high-fat foods include french fries, doughnuts, ice cream, and cakes.  Eat fewer sweets.  Limit foods and drinks that are high in sugar. This includes candy, cookies, regular soda, and sweetened drinks.  Exercise:  Exercise at least 30 minutes per day on most days of the week. Some examples of exercise include walking, biking, dancing, and swimming. You can also fit in more physical activity by taking the stairs instead of the elevator or parking farther away from stores. Ask your healthcare provider about the best exercise plan for you.      © Copyright [employer] 2018 Information is for End User's use only and may not be sold, redistributed or otherwise used for commercial purposes. All illustrations and images included in CareNotes® are the copyrighted property of [employer] or [employer]

## 2025-05-12 NOTE — ASSESSMENT & PLAN NOTE
Continue ezetimibe and simvastatin  Cholesterol values are excellent at 117 with a LDL of 40 but triglycerides went up a bit because of diet

## 2025-05-15 ENCOUNTER — HOSPITAL ENCOUNTER (OUTPATIENT)
Dept: ULTRASOUND IMAGING | Facility: HOSPITAL | Age: 65
Discharge: HOME | End: 2025-05-15
Attending: FAMILY MEDICINE
Payer: MEDICARE

## 2025-05-15 DIAGNOSIS — Z13.6 ENCOUNTER FOR ABDOMINAL AORTIC ANEURYSM SCREENING: ICD-10-CM

## 2025-05-15 DIAGNOSIS — Z13.6 SCREENING FOR AAA (ABDOMINAL AORTIC ANEURYSM): ICD-10-CM

## 2025-05-15 PROCEDURE — 76706 US ABDL AORTA SCREEN AAA: CPT

## 2025-05-21 ENCOUNTER — RESULTS FOLLOW-UP (OUTPATIENT)
Dept: FAMILY MEDICINE CLINIC | Facility: CLINIC | Age: 65
End: 2025-05-21